# Patient Record
Sex: FEMALE | Race: OTHER | Employment: UNEMPLOYED | URBAN - METROPOLITAN AREA
[De-identification: names, ages, dates, MRNs, and addresses within clinical notes are randomized per-mention and may not be internally consistent; named-entity substitution may affect disease eponyms.]

---

## 2022-12-11 ENCOUNTER — HOSPITAL ENCOUNTER (EMERGENCY)
Dept: CT IMAGING | Age: 42
Discharge: HOME | DRG: 872 | End: 2022-12-14

## 2022-12-11 ENCOUNTER — HOSPITAL ENCOUNTER (INPATIENT)
Age: 42
LOS: 4 days | Discharge: HOME | DRG: 872 | End: 2022-12-15
Attending: EMERGENCY MEDICINE | Admitting: FAMILY MEDICINE

## 2022-12-11 ENCOUNTER — HOSPITAL ENCOUNTER (EMERGENCY)
Dept: GENERAL RADIOLOGY | Age: 42
Discharge: HOME | DRG: 872 | End: 2022-12-14

## 2022-12-11 DIAGNOSIS — R73.9 HYPERGLYCEMIA: ICD-10-CM

## 2022-12-11 DIAGNOSIS — A41.9 SEPSIS, DUE TO UNSPECIFIED ORGANISM, UNSPECIFIED WHETHER ACUTE ORGAN DYSFUNCTION PRESENT (HCC): ICD-10-CM

## 2022-12-11 DIAGNOSIS — N17.9 AKI (ACUTE KIDNEY INJURY) (HCC): ICD-10-CM

## 2022-12-11 DIAGNOSIS — N10 ACUTE PYELONEPHRITIS: Primary | ICD-10-CM

## 2022-12-11 PROBLEM — N12 PYELONEPHRITIS: Status: ACTIVE | Noted: 2022-12-11

## 2022-12-11 LAB
ALBUMIN SERPL-MCNC: 2.7 G/DL (ref 3.5–5)
ALBUMIN/GLOB SERPL: 0.6 {RATIO} (ref 0.4–1.6)
ALP SERPL-CCNC: 141 U/L (ref 50–136)
ALT SERPL-CCNC: 33 U/L (ref 12–65)
ANION GAP SERPL CALC-SCNC: 6 MMOL/L (ref 2–11)
APPEARANCE UR: ABNORMAL
AST SERPL-CCNC: 43 U/L (ref 15–37)
BACTERIA URNS QL MICRO: ABNORMAL /HPF
BASOPHILS # BLD: 0.1 K/UL (ref 0–0.2)
BASOPHILS NFR BLD: 0 % (ref 0–2)
BILIRUB SERPL-MCNC: 0.6 MG/DL (ref 0.2–1.1)
BILIRUB UR QL: ABNORMAL
BUN SERPL-MCNC: 17 MG/DL (ref 6–23)
CALCIUM SERPL-MCNC: 8.8 MG/DL (ref 8.3–10.4)
CHLORIDE SERPL-SCNC: 96 MMOL/L (ref 101–110)
CO2 SERPL-SCNC: 25 MMOL/L (ref 21–32)
COLOR UR: ABNORMAL
CREAT SERPL-MCNC: 1.35 MG/DL (ref 0.6–1)
DIFFERENTIAL METHOD BLD: ABNORMAL
EOSINOPHIL # BLD: 0 K/UL (ref 0–0.8)
EOSINOPHIL NFR BLD: 0 % (ref 0.5–7.8)
EPI CELLS #/AREA URNS HPF: ABNORMAL /HPF
ERYTHROCYTE [DISTWIDTH] IN BLOOD BY AUTOMATED COUNT: 13.5 % (ref 11.9–14.6)
FLUAV AG NPH QL IA: NEGATIVE
FLUBV AG NPH QL IA: NEGATIVE
GLOBULIN SER CALC-MCNC: 4.8 G/DL (ref 2.8–4.5)
GLUCOSE BLD STRIP.AUTO-MCNC: 275 MG/DL (ref 65–100)
GLUCOSE SERPL-MCNC: 578 MG/DL (ref 65–100)
GLUCOSE UR STRIP.AUTO-MCNC: >1000 MG/DL
HCG UR QL: NEGATIVE
HCT VFR BLD AUTO: 31.1 % (ref 35.8–46.3)
HGB BLD-MCNC: 9.8 G/DL (ref 11.7–15.4)
HGB UR QL STRIP: ABNORMAL
IMM GRANULOCYTES # BLD AUTO: 0.1 K/UL (ref 0–0.5)
IMM GRANULOCYTES NFR BLD AUTO: 1 % (ref 0–5)
KETONES UR QL STRIP.AUTO: NEGATIVE MG/DL
LACTATE SERPL-SCNC: 1.4 MMOL/L (ref 0.4–2)
LEUKOCYTE ESTERASE UR QL STRIP.AUTO: ABNORMAL
LYMPHOCYTES # BLD: 1 K/UL (ref 0.5–4.6)
LYMPHOCYTES NFR BLD: 7 % (ref 13–44)
MCH RBC QN AUTO: 28.6 PG (ref 26.1–32.9)
MCHC RBC AUTO-ENTMCNC: 31.5 G/DL (ref 31.4–35)
MCV RBC AUTO: 90.7 FL (ref 82–102)
MONOCYTES # BLD: 0.7 K/UL (ref 0.1–1.3)
MONOCYTES NFR BLD: 5 % (ref 4–12)
NEUTS SEG # BLD: 13 K/UL (ref 1.7–8.2)
NEUTS SEG NFR BLD: 87 % (ref 43–78)
NITRITE UR QL STRIP.AUTO: POSITIVE
NRBC # BLD: 0 K/UL (ref 0–0.2)
PH UR STRIP: 5 [PH] (ref 5–9)
PLATELET # BLD AUTO: 294 K/UL (ref 150–450)
PMV BLD AUTO: 9.8 FL (ref 9.4–12.3)
POTASSIUM SERPL-SCNC: 4.4 MMOL/L (ref 3.5–5.1)
PROCALCITONIN SERPL-MCNC: 2.63 NG/ML (ref 0–0.49)
PROT SERPL-MCNC: 7.5 G/DL (ref 6.3–8.2)
PROT UR STRIP-MCNC: 100 MG/DL
RBC # BLD AUTO: 3.43 M/UL (ref 4.05–5.2)
RBC #/AREA URNS HPF: ABNORMAL /HPF
SARS-COV-2 RDRP RESP QL NAA+PROBE: NOT DETECTED
SERVICE CMNT-IMP: ABNORMAL
SODIUM SERPL-SCNC: 127 MMOL/L (ref 133–143)
SOURCE: NORMAL
SP GR UR REFRACTOMETRY: 1.03 (ref 1–1.02)
SPECIMEN SOURCE: NORMAL
UROBILINOGEN UR QL STRIP.AUTO: 1 EU/DL (ref 0.2–1)
WBC # BLD AUTO: 14.9 K/UL (ref 4.3–11.1)
WBC URNS QL MICRO: ABNORMAL /HPF
YEAST URNS QL MICRO: ABNORMAL

## 2022-12-11 PROCEDURE — 87077 CULTURE AEROBIC IDENTIFY: CPT

## 2022-12-11 PROCEDURE — 74176 CT ABD & PELVIS W/O CONTRAST: CPT

## 2022-12-11 PROCEDURE — 96365 THER/PROPH/DIAG IV INF INIT: CPT

## 2022-12-11 PROCEDURE — 83605 ASSAY OF LACTIC ACID: CPT

## 2022-12-11 PROCEDURE — 71045 X-RAY EXAM CHEST 1 VIEW: CPT

## 2022-12-11 PROCEDURE — 87186 SC STD MICRODIL/AGAR DIL: CPT

## 2022-12-11 PROCEDURE — 6360000002 HC RX W HCPCS: Performed by: EMERGENCY MEDICINE

## 2022-12-11 PROCEDURE — 84145 PROCALCITONIN (PCT): CPT

## 2022-12-11 PROCEDURE — 2580000003 HC RX 258: Performed by: EMERGENCY MEDICINE

## 2022-12-11 PROCEDURE — 87804 INFLUENZA ASSAY W/OPTIC: CPT

## 2022-12-11 PROCEDURE — 96375 TX/PRO/DX INJ NEW DRUG ADDON: CPT

## 2022-12-11 PROCEDURE — 81025 URINE PREGNANCY TEST: CPT

## 2022-12-11 PROCEDURE — 87205 SMEAR GRAM STAIN: CPT

## 2022-12-11 PROCEDURE — 81001 URINALYSIS AUTO W/SCOPE: CPT

## 2022-12-11 PROCEDURE — 96374 THER/PROPH/DIAG INJ IV PUSH: CPT

## 2022-12-11 PROCEDURE — 87040 BLOOD CULTURE FOR BACTERIA: CPT

## 2022-12-11 PROCEDURE — 80053 COMPREHEN METABOLIC PANEL: CPT

## 2022-12-11 PROCEDURE — 87086 URINE CULTURE/COLONY COUNT: CPT

## 2022-12-11 PROCEDURE — 1100000000 HC RM PRIVATE

## 2022-12-11 PROCEDURE — 87635 SARS-COV-2 COVID-19 AMP PRB: CPT

## 2022-12-11 PROCEDURE — 6370000000 HC RX 637 (ALT 250 FOR IP)

## 2022-12-11 PROCEDURE — 96361 HYDRATE IV INFUSION ADD-ON: CPT

## 2022-12-11 PROCEDURE — 87150 DNA/RNA AMPLIFIED PROBE: CPT

## 2022-12-11 PROCEDURE — 82962 GLUCOSE BLOOD TEST: CPT

## 2022-12-11 PROCEDURE — 85025 COMPLETE CBC W/AUTO DIFF WBC: CPT

## 2022-12-11 PROCEDURE — 99285 EMERGENCY DEPT VISIT HI MDM: CPT

## 2022-12-11 RX ORDER — 0.9 % SODIUM CHLORIDE 0.9 %
1000 INTRAVENOUS SOLUTION INTRAVENOUS ONCE
Status: COMPLETED | OUTPATIENT
Start: 2022-12-11 | End: 2022-12-12

## 2022-12-11 RX ORDER — SODIUM CHLORIDE, SODIUM LACTATE, POTASSIUM CHLORIDE, AND CALCIUM CHLORIDE .6; .31; .03; .02 G/100ML; G/100ML; G/100ML; G/100ML
30 INJECTION, SOLUTION INTRAVENOUS ONCE
Status: COMPLETED | OUTPATIENT
Start: 2022-12-11 | End: 2022-12-11

## 2022-12-11 RX ORDER — ONDANSETRON 2 MG/ML
4 INJECTION INTRAMUSCULAR; INTRAVENOUS
Status: COMPLETED | OUTPATIENT
Start: 2022-12-11 | End: 2022-12-11

## 2022-12-11 RX ADMIN — INSULIN HUMAN 10 UNITS: 100 INJECTION, SOLUTION PARENTERAL at 22:33

## 2022-12-11 RX ADMIN — SODIUM CHLORIDE, POTASSIUM CHLORIDE, SODIUM LACTATE AND CALCIUM CHLORIDE 1365 ML: 600; 310; 30; 20 INJECTION, SOLUTION INTRAVENOUS at 21:34

## 2022-12-11 RX ADMIN — SODIUM CHLORIDE 1000 ML: 9 INJECTION, SOLUTION INTRAVENOUS at 22:32

## 2022-12-11 RX ADMIN — CEFTRIAXONE 1000 MG: 1 INJECTION, POWDER, FOR SOLUTION INTRAMUSCULAR; INTRAVENOUS at 21:35

## 2022-12-11 RX ADMIN — ONDANSETRON 4 MG: 2 INJECTION INTRAMUSCULAR; INTRAVENOUS at 21:35

## 2022-12-11 ASSESSMENT — PAIN SCALES - GENERAL
PAINLEVEL_OUTOF10: 8
PAINLEVEL_OUTOF10: 8
PAINLEVEL_OUTOF10: 3
PAINLEVEL_OUTOF10: 8

## 2022-12-11 ASSESSMENT — ENCOUNTER SYMPTOMS
SORE THROAT: 0
PHOTOPHOBIA: 0
COUGH: 1
RHINORRHEA: 1
BLOOD IN STOOL: 0
SHORTNESS OF BREATH: 0
VOICE CHANGE: 0
STRIDOR: 0
CONSTIPATION: 0
ABDOMINAL DISTENTION: 0
DIARRHEA: 0
WHEEZING: 0
TROUBLE SWALLOWING: 0
VOMITING: 1
NAUSEA: 1

## 2022-12-11 ASSESSMENT — PAIN DESCRIPTION - LOCATION: LOCATION: GENERALIZED

## 2022-12-11 ASSESSMENT — PAIN - FUNCTIONAL ASSESSMENT: PAIN_FUNCTIONAL_ASSESSMENT: 0-10

## 2022-12-12 PROBLEM — N17.9 AKI (ACUTE KIDNEY INJURY) (HCC): Status: ACTIVE | Noted: 2022-12-12

## 2022-12-12 PROBLEM — E11.9 DM2 (DIABETES MELLITUS, TYPE 2) (HCC): Status: ACTIVE | Noted: 2022-12-12

## 2022-12-12 PROBLEM — E87.1 HYPONATREMIA: Status: ACTIVE | Noted: 2022-12-12

## 2022-12-12 PROBLEM — E78.2 HYPERLIPIDEMIA, MIXED: Status: ACTIVE | Noted: 2019-04-15

## 2022-12-12 LAB
ACCESSION NUMBER, LLC1M: ABNORMAL
ACINETOBACTER CALCOAC BAUMANNII COMPLEX BY PCR: NOT DETECTED
ALBUMIN SERPL-MCNC: 2.1 G/DL (ref 3.5–5)
ALBUMIN/GLOB SERPL: 0.5 {RATIO} (ref 0.4–1.6)
ALP SERPL-CCNC: 117 U/L (ref 50–130)
ALT SERPL-CCNC: 26 U/L (ref 12–65)
ANION GAP SERPL CALC-SCNC: 4 MMOL/L (ref 2–11)
AST SERPL-CCNC: 20 U/L (ref 15–37)
BACTEROIDES FRAGILIS BY PCR: NOT DETECTED
BASOPHILS # BLD: 0 K/UL (ref 0–0.2)
BASOPHILS NFR BLD: 0 % (ref 0–2)
BILIRUB SERPL-MCNC: 0.4 MG/DL (ref 0.2–1.1)
BIOFIRE TEST COMMENT: ABNORMAL
BLACTX-M ISLT/SPM QL: NOT DETECTED
BLAIMP ISLT/SPM QL: NOT DETECTED
BLAKPC BLD POS QL NAA+NON-PROBE: NOT DETECTED
BLAOXA-48-LIKE ISLT/SPM QL: NOT DETECTED
BLAVIM ISLT/SPM QL: NOT DETECTED
BUN SERPL-MCNC: 13 MG/DL (ref 6–23)
C ALBICANS DNA BLD POS QL NAA+NON-PROBE: NOT DETECTED
C GLABRATA DNA BLD POS QL NAA+NON-PROBE: NOT DETECTED
C KRUSEI DNA BLD POS QL NAA+NON-PROBE: NOT DETECTED
C PARAP DNA BLD POS QL NAA+NON-PROBE: NOT DETECTED
C TROPICLS DNA BLD POS QL NAA+NON-PROBE: NOT DETECTED
CALCIUM SERPL-MCNC: 7.8 MG/DL (ref 8.3–10.4)
CANDIDA AURIS BY PCR: NOT DETECTED
CHLORIDE SERPL-SCNC: 110 MMOL/L (ref 101–110)
CO2 SERPL-SCNC: 25 MMOL/L (ref 21–32)
COLISTIN RES MCR-1 ISLT/SPM QL: NOT DETECTED
CREAT SERPL-MCNC: 0.76 MG/DL (ref 0.6–1)
CRYPTOCOCCUS NEOFORMANS/GATTII BY PCR: NOT DETECTED
DIFFERENTIAL METHOD BLD: ABNORMAL
E CLOAC COMP DNA BLD POS NAA+NON-PROBE: NOT DETECTED
E COLI DNA BLD POS QL NAA+NON-PROBE: DETECTED
EKG ATRIAL RATE: 102 BPM
EKG DIAGNOSIS: NORMAL
EKG P AXIS: 58 DEGREES
EKG P-R INTERVAL: 136 MS
EKG Q-T INTERVAL: 324 MS
EKG QRS DURATION: 76 MS
EKG QTC CALCULATION (BAZETT): 422 MS
EKG R AXIS: 109 DEGREES
EKG T AXIS: 34 DEGREES
EKG VENTRICULAR RATE: 102 BPM
ENTEROBACTERALES BY PCR: DETECTED
ENTEROCOCCUS FAECALIS BY PCR: NOT DETECTED
ENTEROCOCCUS FAECIUM BY PCR: NOT DETECTED
EOSINOPHIL # BLD: 0 K/UL (ref 0–0.8)
EOSINOPHIL NFR BLD: 0 % (ref 0.5–7.8)
ERYTHROCYTE [DISTWIDTH] IN BLOOD BY AUTOMATED COUNT: 13.6 % (ref 11.9–14.6)
GLOBULIN SER CALC-MCNC: 4.2 G/DL (ref 2.8–4.5)
GLUCOSE BLD STRIP.AUTO-MCNC: 209 MG/DL (ref 65–100)
GLUCOSE BLD STRIP.AUTO-MCNC: 248 MG/DL (ref 65–100)
GLUCOSE BLD STRIP.AUTO-MCNC: 258 MG/DL (ref 65–100)
GLUCOSE BLD STRIP.AUTO-MCNC: 432 MG/DL (ref 65–100)
GLUCOSE SERPL-MCNC: 256 MG/DL (ref 65–100)
GP B STREP DNA BLD POS QL NAA+NON-PROBE: NOT DETECTED
HAEM INFLU DNA BLD POS QL NAA+NON-PROBE: NOT DETECTED
HCT VFR BLD AUTO: 26.4 % (ref 35.8–46.3)
HGB BLD-MCNC: 8.5 G/DL (ref 11.7–15.4)
IMM GRANULOCYTES # BLD AUTO: 0.1 K/UL (ref 0–0.5)
IMM GRANULOCYTES NFR BLD AUTO: 1 % (ref 0–5)
K OXYTOCA DNA BLD POS QL NAA+NON-PROBE: NOT DETECTED
KLEBSIELLA AEROGENES BY PCR: NOT DETECTED
KLEBSIELLA PNEUMONIAE GROUP BY PCR: NOT DETECTED
L MONOCYTOG DNA BLD POS QL NAA+NON-PROBE: NOT DETECTED
LACTATE SERPL-SCNC: 0.9 MMOL/L (ref 0.4–2)
LYMPHOCYTES # BLD: 0.8 K/UL (ref 0.5–4.6)
LYMPHOCYTES NFR BLD: 8 % (ref 13–44)
MCH RBC QN AUTO: 29.2 PG (ref 26.1–32.9)
MCHC RBC AUTO-ENTMCNC: 32.2 G/DL (ref 31.4–35)
MCV RBC AUTO: 90.7 FL (ref 82–102)
MONOCYTES # BLD: 0.4 K/UL (ref 0.1–1.3)
MONOCYTES NFR BLD: 4 % (ref 4–12)
N MEN DNA BLD POS QL NAA+NON-PROBE: NOT DETECTED
NEUTS SEG # BLD: 9.2 K/UL (ref 1.7–8.2)
NEUTS SEG NFR BLD: 87 % (ref 43–78)
NRBC # BLD: 0 K/UL (ref 0–0.2)
P AERUGINOSA DNA BLD POS NAA+NON-PROBE: NOT DETECTED
PLATELET # BLD AUTO: 246 K/UL (ref 150–450)
PMV BLD AUTO: 9.6 FL (ref 9.4–12.3)
POTASSIUM SERPL-SCNC: 3.7 MMOL/L (ref 3.5–5.1)
PROT SERPL-MCNC: 6.3 G/DL (ref 6.3–8.2)
PROTEUS SP DNA BLD POS QL NAA+NON-PROBE: NOT DETECTED
RBC # BLD AUTO: 2.91 M/UL (ref 4.05–5.2)
RESISTANT GENE NDM BY PCR: NOT DETECTED
RESISTANT GENE TARGETS: ABNORMAL
S AUREUS DNA BLD POS QL NAA+NON-PROBE: NOT DETECTED
S AUREUS+CONS DNA BLD POS NAA+NON-PROBE: NOT DETECTED
S MARCESCENS DNA BLD POS NAA+NON-PROBE: NOT DETECTED
S PNEUM DNA BLD POS QL NAA+NON-PROBE: NOT DETECTED
S PYO DNA BLD POS QL NAA+NON-PROBE: NOT DETECTED
SALMONELLA SPECIES BY PCR: NOT DETECTED
SERVICE CMNT-IMP: ABNORMAL
SODIUM SERPL-SCNC: 139 MMOL/L (ref 133–143)
STAPHYLOCOCCUS EPIDERMIDIS BY PCR: NOT DETECTED
STAPHYLOCOCCUS LUGDUNENSIS BY PCR: NOT DETECTED
STENOTROPHOMONAS MALTOPHILIA BY PCR: NOT DETECTED
STREPTOCOCCUS DNA BLD POS NAA+NON-PROBE: NOT DETECTED
WBC # BLD AUTO: 10.6 K/UL (ref 4.3–11.1)

## 2022-12-12 PROCEDURE — 36415 COLL VENOUS BLD VENIPUNCTURE: CPT

## 2022-12-12 PROCEDURE — 2580000003 HC RX 258: Performed by: NURSE PRACTITIONER

## 2022-12-12 PROCEDURE — 1100000000 HC RM PRIVATE

## 2022-12-12 PROCEDURE — 6370000000 HC RX 637 (ALT 250 FOR IP): Performed by: FAMILY MEDICINE

## 2022-12-12 PROCEDURE — 82962 GLUCOSE BLOOD TEST: CPT

## 2022-12-12 PROCEDURE — 80053 COMPREHEN METABOLIC PANEL: CPT

## 2022-12-12 PROCEDURE — 6360000002 HC RX W HCPCS: Performed by: FAMILY MEDICINE

## 2022-12-12 PROCEDURE — 6360000002 HC RX W HCPCS: Performed by: NURSE PRACTITIONER

## 2022-12-12 PROCEDURE — 85025 COMPLETE CBC W/AUTO DIFF WBC: CPT

## 2022-12-12 PROCEDURE — 83605 ASSAY OF LACTIC ACID: CPT

## 2022-12-12 PROCEDURE — 6370000000 HC RX 637 (ALT 250 FOR IP): Performed by: NURSE PRACTITIONER

## 2022-12-12 PROCEDURE — 2580000003 HC RX 258: Performed by: FAMILY MEDICINE

## 2022-12-12 RX ORDER — INSULIN LISPRO 100 [IU]/ML
6 INJECTION, SOLUTION INTRAVENOUS; SUBCUTANEOUS ONCE
Status: COMPLETED | OUTPATIENT
Start: 2022-12-12 | End: 2022-12-12

## 2022-12-12 RX ORDER — ACETAMINOPHEN 650 MG/1
650 SUPPOSITORY RECTAL EVERY 6 HOURS PRN
Status: DISCONTINUED | OUTPATIENT
Start: 2022-12-12 | End: 2022-12-15 | Stop reason: HOSPADM

## 2022-12-12 RX ORDER — 0.9 % SODIUM CHLORIDE 0.9 %
1000 INTRAVENOUS SOLUTION INTRAVENOUS ONCE
Status: COMPLETED | OUTPATIENT
Start: 2022-12-12 | End: 2022-12-12

## 2022-12-12 RX ORDER — INSULIN GLARGINE 100 [IU]/ML
20 INJECTION, SOLUTION SUBCUTANEOUS
Status: ON HOLD | COMMUNITY
Start: 2022-11-08 | End: 2022-12-15 | Stop reason: SDUPTHER

## 2022-12-12 RX ORDER — HYDROMORPHONE HYDROCHLORIDE 1 MG/ML
1 INJECTION, SOLUTION INTRAMUSCULAR; INTRAVENOUS; SUBCUTANEOUS EVERY 4 HOURS PRN
Status: DISCONTINUED | OUTPATIENT
Start: 2022-12-12 | End: 2022-12-15 | Stop reason: HOSPADM

## 2022-12-12 RX ORDER — ATORVASTATIN CALCIUM 20 MG/1
40 TABLET, FILM COATED ORAL NIGHTLY
COMMUNITY
Start: 2022-11-08

## 2022-12-12 RX ORDER — ENOXAPARIN SODIUM 100 MG/ML
40 INJECTION SUBCUTANEOUS DAILY
Status: DISCONTINUED | OUTPATIENT
Start: 2022-12-12 | End: 2022-12-15 | Stop reason: HOSPADM

## 2022-12-12 RX ORDER — SODIUM CHLORIDE 0.9 % (FLUSH) 0.9 %
5-40 SYRINGE (ML) INJECTION PRN
Status: DISCONTINUED | OUTPATIENT
Start: 2022-12-12 | End: 2022-12-15 | Stop reason: HOSPADM

## 2022-12-12 RX ORDER — POLYETHYLENE GLYCOL 3350 17 G/17G
17 POWDER, FOR SOLUTION ORAL DAILY PRN
Status: DISCONTINUED | OUTPATIENT
Start: 2022-12-12 | End: 2022-12-15 | Stop reason: HOSPADM

## 2022-12-12 RX ORDER — ONDANSETRON 4 MG/1
4 TABLET, ORALLY DISINTEGRATING ORAL EVERY 8 HOURS PRN
Status: DISCONTINUED | OUTPATIENT
Start: 2022-12-12 | End: 2022-12-15 | Stop reason: HOSPADM

## 2022-12-12 RX ORDER — FERROUS SULFATE 325(65) MG
325 TABLET ORAL DAILY
COMMUNITY
Start: 2022-11-08

## 2022-12-12 RX ORDER — BENZONATATE 100 MG/1
100 CAPSULE ORAL 3 TIMES DAILY PRN
Status: DISCONTINUED | OUTPATIENT
Start: 2022-12-12 | End: 2022-12-15 | Stop reason: HOSPADM

## 2022-12-12 RX ORDER — INSULIN LISPRO 100 [IU]/ML
0-4 INJECTION, SOLUTION INTRAVENOUS; SUBCUTANEOUS NIGHTLY
Status: DISCONTINUED | OUTPATIENT
Start: 2022-12-12 | End: 2022-12-14

## 2022-12-12 RX ORDER — SODIUM CHLORIDE 9 MG/ML
INJECTION, SOLUTION INTRAVENOUS PRN
Status: DISCONTINUED | OUTPATIENT
Start: 2022-12-12 | End: 2022-12-15 | Stop reason: HOSPADM

## 2022-12-12 RX ORDER — ACETAMINOPHEN 325 MG/1
650 TABLET ORAL EVERY 6 HOURS PRN
Status: DISCONTINUED | OUTPATIENT
Start: 2022-12-12 | End: 2022-12-15 | Stop reason: HOSPADM

## 2022-12-12 RX ORDER — ATORVASTATIN CALCIUM 40 MG/1
20 TABLET, FILM COATED ORAL NIGHTLY
Status: DISCONTINUED | OUTPATIENT
Start: 2022-12-12 | End: 2022-12-15 | Stop reason: HOSPADM

## 2022-12-12 RX ORDER — SODIUM CHLORIDE 0.9 % (FLUSH) 0.9 %
5-40 SYRINGE (ML) INJECTION EVERY 12 HOURS SCHEDULED
Status: DISCONTINUED | OUTPATIENT
Start: 2022-12-12 | End: 2022-12-15 | Stop reason: HOSPADM

## 2022-12-12 RX ORDER — FERROUS SULFATE 325(65) MG
325 TABLET ORAL DAILY
Status: DISCONTINUED | OUTPATIENT
Start: 2022-12-12 | End: 2022-12-15 | Stop reason: HOSPADM

## 2022-12-12 RX ORDER — L. ACIDOPHILUS/L.BULGARICUS 1MM CELL
4 TABLET ORAL 3 TIMES DAILY
Status: DISCONTINUED | OUTPATIENT
Start: 2022-12-12 | End: 2022-12-15 | Stop reason: HOSPADM

## 2022-12-12 RX ORDER — ONDANSETRON 2 MG/ML
4 INJECTION INTRAMUSCULAR; INTRAVENOUS EVERY 6 HOURS PRN
Status: DISCONTINUED | OUTPATIENT
Start: 2022-12-12 | End: 2022-12-15 | Stop reason: HOSPADM

## 2022-12-12 RX ORDER — HYDROCODONE BITARTRATE AND ACETAMINOPHEN 5; 325 MG/1; MG/1
1 TABLET ORAL EVERY 6 HOURS PRN
Status: DISCONTINUED | OUTPATIENT
Start: 2022-12-12 | End: 2022-12-15 | Stop reason: HOSPADM

## 2022-12-12 RX ORDER — INSULIN LISPRO 100 [IU]/ML
0-8 INJECTION, SOLUTION INTRAVENOUS; SUBCUTANEOUS
Status: DISCONTINUED | OUTPATIENT
Start: 2022-12-12 | End: 2022-12-14

## 2022-12-12 RX ORDER — INSULIN GLARGINE 100 [IU]/ML
20 INJECTION, SOLUTION SUBCUTANEOUS
Status: DISCONTINUED | OUTPATIENT
Start: 2022-12-12 | End: 2022-12-13

## 2022-12-12 RX ORDER — ONDANSETRON 2 MG/ML
4 INJECTION INTRAMUSCULAR; INTRAVENOUS ONCE
Status: COMPLETED | OUTPATIENT
Start: 2022-12-12 | End: 2022-12-12

## 2022-12-12 RX ORDER — SODIUM CHLORIDE 9 MG/ML
INJECTION, SOLUTION INTRAVENOUS CONTINUOUS
Status: DISCONTINUED | OUTPATIENT
Start: 2022-12-12 | End: 2022-12-15 | Stop reason: HOSPADM

## 2022-12-12 RX ADMIN — HYDROCODONE BITARTRATE AND ACETAMINOPHEN 1 TABLET: 5; 325 TABLET ORAL at 16:22

## 2022-12-12 RX ADMIN — CEFTRIAXONE 2000 MG: 2 INJECTION, POWDER, FOR SOLUTION INTRAMUSCULAR; INTRAVENOUS at 21:09

## 2022-12-12 RX ADMIN — ENOXAPARIN SODIUM 40 MG: 100 INJECTION SUBCUTANEOUS at 08:25

## 2022-12-12 RX ADMIN — INSULIN LISPRO 4 UNITS: 100 INJECTION, SOLUTION INTRAVENOUS; SUBCUTANEOUS at 16:42

## 2022-12-12 RX ADMIN — HYDROCODONE BITARTRATE AND ACETAMINOPHEN 1 TABLET: 5; 325 TABLET ORAL at 22:16

## 2022-12-12 RX ADMIN — SODIUM CHLORIDE: 9 INJECTION, SOLUTION INTRAVENOUS at 08:29

## 2022-12-12 RX ADMIN — SODIUM CHLORIDE, PRESERVATIVE FREE 10 ML: 5 INJECTION INTRAVENOUS at 08:29

## 2022-12-12 RX ADMIN — SODIUM CHLORIDE: 9 INJECTION, SOLUTION INTRAVENOUS at 15:35

## 2022-12-12 RX ADMIN — INSULIN LISPRO 4 UNITS: 100 INJECTION, SOLUTION INTRAVENOUS; SUBCUTANEOUS at 21:11

## 2022-12-12 RX ADMIN — LACTOBACILLUS TAB 4 TABLET: TAB at 21:08

## 2022-12-12 RX ADMIN — SODIUM CHLORIDE, PRESERVATIVE FREE 10 ML: 5 INJECTION INTRAVENOUS at 21:10

## 2022-12-12 RX ADMIN — INSULIN LISPRO 2 UNITS: 100 INJECTION, SOLUTION INTRAVENOUS; SUBCUTANEOUS at 11:53

## 2022-12-12 RX ADMIN — SODIUM CHLORIDE 1000 ML: 900 INJECTION, SOLUTION INTRAVENOUS at 00:21

## 2022-12-12 RX ADMIN — INSULIN LISPRO 6 UNITS: 100 INJECTION, SOLUTION INTRAVENOUS; SUBCUTANEOUS at 21:20

## 2022-12-12 RX ADMIN — SODIUM CHLORIDE: 9 INJECTION, SOLUTION INTRAVENOUS at 22:19

## 2022-12-12 RX ADMIN — INSULIN GLARGINE 20 UNITS: 100 INJECTION, SOLUTION SUBCUTANEOUS at 06:44

## 2022-12-12 RX ADMIN — ATORVASTATIN CALCIUM 20 MG: 40 TABLET, FILM COATED ORAL at 21:08

## 2022-12-12 RX ADMIN — FERROUS SULFATE TAB 325 MG (65 MG ELEMENTAL FE) 325 MG: 325 (65 FE) TAB at 08:25

## 2022-12-12 RX ADMIN — ONDANSETRON 4 MG: 2 INJECTION INTRAMUSCULAR; INTRAVENOUS at 00:35

## 2022-12-12 RX ADMIN — SODIUM CHLORIDE: 9 INJECTION, SOLUTION INTRAVENOUS at 03:04

## 2022-12-12 RX ADMIN — INSULIN LISPRO 2 UNITS: 100 INJECTION, SOLUTION INTRAVENOUS; SUBCUTANEOUS at 08:25

## 2022-12-12 RX ADMIN — BENZONATATE 100 MG: 100 CAPSULE ORAL at 16:41

## 2022-12-12 ASSESSMENT — PAIN SCALES - GENERAL
PAINLEVEL_OUTOF10: 2
PAINLEVEL_OUTOF10: 5
PAINLEVEL_OUTOF10: 6
PAINLEVEL_OUTOF10: 0
PAINLEVEL_OUTOF10: 3

## 2022-12-12 ASSESSMENT — PAIN - FUNCTIONAL ASSESSMENT: PAIN_FUNCTIONAL_ASSESSMENT: ACTIVITIES ARE NOT PREVENTED

## 2022-12-12 ASSESSMENT — PAIN DESCRIPTION - ORIENTATION
ORIENTATION: RIGHT;LEFT
ORIENTATION: LEFT

## 2022-12-12 ASSESSMENT — PAIN DESCRIPTION - DESCRIPTORS
DESCRIPTORS: ACHING;SORE;CRAMPING
DESCRIPTORS: PRESSURE

## 2022-12-12 ASSESSMENT — PAIN DESCRIPTION - LOCATION
LOCATION: FLANK
LOCATION: FLANK

## 2022-12-12 NOTE — PROGRESS NOTES
Patient speaks Persian as their preferred language for their healthcare communication. If there are technical problems using the AM[employer] mobil unit, please contact Language Services for interpretation at:    Senior Alexei -Navigator ([phone])  General phone: [phone] ( [phone])  Email: [email or web address]    Please always document the use of interpreting services (name and/or 's ID number) in your clinical notes. Our interpreters are available for team members working with limited  English proficient (LEP) patients remotely, in person (if needed for special cases), as phone or video interpreters on the [employer] Mobil units.         Thank you,        Mayda VERA  Senior /Navigator

## 2022-12-12 NOTE — ED NOTES
TRANSFER - OUT REPORT:    Verbal report given to Jermaine Garcia RN on Meghan Saunders  being transferred to [id number] for routine progression of patient care       Report consisted of patient's Situation, Background, Assessment and   Recommendations(SBAR). Information from the following report(s) ED Encounter Summary was reviewed with the receiving nurse. Lines:   Peripheral IV 12/11/22 Left Hand (Active)   Site Assessment Clean, dry & intact 12/11/22 2130   Line Status Brisk blood return 12/11/22 2130       Peripheral IV 12/11/22 Right Antecubital (Active)   Site Assessment Clean, dry & intact 12/11/22 2131   Line Status Brisk blood return 12/11/22 2131        Opportunity for questions and clarification was provided.       Patient transported with:  Registered Nurse       Fanny Fitch RN  12/12/22 0001

## 2022-12-12 NOTE — PROGRESS NOTES
/Cultural Navigator assessed patient's language needs. The patient states that She understands English but she would like to have an [place]  when interacting with medical staff. LS available resources were offered as needed.     Thank you,          Elmira Yung [facility] [employer]  [phone] (phone)

## 2022-12-12 NOTE — CARE COORDINATION
Medical record reviewed. Patient care plan reviewed in interdisciplinary rounds with the following disciplines: MD, nursing, case management, therapy, and nutrition services. CM met with patient to introduce self and explain role in planning.  services utilized (Vicky Shell # [id number]). Prior to admission, pt was living independently in her home with her family (spouse, adult dtr- Lady Tierney, and 8 yr old dtr). Pt works part time as a .  is employed. Pt is uninsured but has a primary care provider. She could not remember the name of the clinic however she confirmed that it is an income based clinic in [place]. No discharge planning needs are anticipated however CM will continue to follow to assist if needed. 12/12/22 6491   Service Assessment   Patient Orientation Alert and Oriented;Person;Place;Situation;Self   Cognition Alert   History Provided By Patient   Primary Caregiver Self   Accompanied By/Relationship no one   Support Systems Spouse/Significant Other;Children   Patient's Healthcare Decision Maker is: Legal Next of Kin   PCP Verified by CM Yes   Last Visit to PCP Within last 6 months   Prior Functional Level Independent in ADLs/IADLs   Current Functional Level Assistance with the following:;Housework; Shopping   Can patient return to prior living arrangement Yes   Ability to make needs known: Good   Family able to assist with home care needs: Yes   Would you like for me to discuss the discharge plan with any other family members/significant others, and if so, who?  No   Financial Resources Financial Counseling   Community Resources Other (Comment)  ([facility] clinic in [place])

## 2022-12-12 NOTE — PROGRESS NOTES
TRANSFER - IN REPORT:    Verbal report received from [facility], RN on Garo Benson  being received from ED for routine progression of patient care      Report consisted of patient's Situation, Background, Assessment and   Recommendations(SBAR). Information from the following report(s) Nurse Handoff Report, ED Encounter Summary, ED SBAR, Adult Overview, Intake/Output, and MAR was reviewed with the receiving nurse. Opportunity for questions and clarification was provided. Assessment completed per RN upon patient's arrival to unit and care assumed.

## 2022-12-12 NOTE — ASSESSMENT & PLAN NOTE
- UA indicative of infection, CT with evidence of pyelo  - IV rocephin  - Urine and blood cultures obtained  - IVFs

## 2022-12-12 NOTE — ED PROVIDER NOTES
Emergency Department Provider Note                   PCP:                None Provider               Age: 39 y.o. Sex: female       ICD-10-CM    1. Acute pyelonephritis  N10       2. Sepsis, due to unspecified organism, unspecified whether acute organ dysfunction present ([facility] [address]  A41.9       3. Hyperglycemia  R73.9           DISPOSITION          MDM  Number of Diagnoses or Management Options  Acute pyelonephritis  Hyperglycemia: new, needed workup  Sepsis, due to unspecified organism, unspecified whether acute organ dysfunction present [facility]): new, needed workup  Diagnosis management comments: Flu, COVID swab, UA ordered in triage. Upon evaluating patient, decision made to obtain IV access and sepsis protocol orders. 30 cc/kg IV fluid bolus of LR ordered as well as Rocephin 1 g IV given RN reports the urine dipstick with positive nitrites. Patient recently admitted for pyelonephritis/sepsis in October 2022. Urine culture positive for E. coli Rocephin. Urine micro with 4+ bacteria, positive nitrite, moderate LE, 20-50 WBCs. Urine culture added on. Sodium 127. CO2 normal at 25. Anion gap 6. Glucose noted to be elevated at 578. No ketones noted on urine dipstick. No concern for DKA at this time. Additional 1 L normal saline IV fluid bolus ordered. Insulin 10 units IV ordered. Given patient with bilateral flank pain and recent admission for pyelonephritis with possible abscess will obtain CT renal stone protocol. CT w/ findings noted below. Will consult Hospitalist for admission.           Amount and/or Complexity of Data Reviewed  Clinical lab tests: ordered and reviewed  Tests in the radiology section of CPT®: ordered and reviewed  Tests in the medicine section of CPT®: ordered and reviewed  Review and summarize past medical records: yes  Discuss the patient with other providers: yes  Independent visualization of images, tracings, or specimens: yes    Risk of Complications, Morbidity, and/or Mortality  Presenting problems: high  Diagnostic procedures: high  Management options: high    Patient Progress  Patient progress: stable       ED Course as of 12/11/22 2332   Sun Dec 11, 2022   2109 Influenza A Ag: Negative [DF]   2109 Influenza B Ag: Negative [DF]   2109 SOURCE, [id number]: Nasopharyngeal [DF]   2130 Bacteria, UA(!): 4+ [DF]   2130 WBC, UA: 20-50 [DF]   2130 Leukocyte Esterase, Urine(!): MODERATE [DF]   2130 Nitrite, Urine(!): Positive [DF]   2158 WBC(!): 14.9 [DF]   2158 Portable Chest X-ray FINDINGS: The lungs are clear. The cardiac size, mediastinal contour and  pulmonary vasculature are normal. No pneumothorax or pleural effusion is seen. The bones are intact. IMPRESSION: Normal chest x-ray. [DF]   2206 Creatinine(!): 1.35 [DF]   2206 Glucose, Random(!!): 578 [DF]   2206 Sodium(!): 127 [DF]   2330 CT abdomen/pelvis IMPRESSION:  1. Hazy stranding around the left kidney. Differential considerations include  pyelonephritis and sequela of a recently passed stone. 2. Constipation.     [DF]      ED Course User Index  [DF] Alessio Bishop MD        Orders Placed This Encounter   Procedures    Critical Care    Rapid influenza A/B antigens    COVID-19, Rapid    Blood Culture 1    Blood Culture 2    Culture, Urine    XR CHEST PORTABLE    CT ABDOMEN PELVIS RENAL STONE    Urinalysis w rflx microscopic    Comprehensive Metabolic Panel    CBC with Auto Differential    Lactate, Sepsis    Procalcitonin    POCT Urine Dipstick    POC PREGNANCY UR-QUAL    Strict intake and output    POC Pregnancy Urine Qual    POCT Glucose    EKG 12 Lead    Saline lock IV        Medications   0.9 % sodium chloride bolus (1,000 mLs IntraVENous New Bag 12/11/22 2232)   lactated ringers bolus (0 mL/kg × 45.5 kg (Ideal) IntraVENous Stopped 12/11/22 2255)   cefTRIAXone (ROCEPHIN) 1,000 mg in sodium chloride 0.9 % 50 mL IVPB mini-bag (0 mg IntraVENous Stopped 12/11/22 2205)   ondansetron (ZOFRAN) injection 4 mg (4 mg IntraVENous Given 12/11/22 2135)   insulin regular (HUMULIN R;NOVOLIN R) injection 10 Units (10 Units IntraVENous Given 12/11/22 2233)       New Prescriptions    No medications on file        Agustina Bamberger is a 39 y.o. female who presents to the Emergency Department with chief complaint of fatigue, myalgias. Chief Complaint   Patient presents with    Cough    Dizziness    Emesis    Generalized Body Aches      75-year-old female with history of type 2 diabetes mellitus, pyelonephritis presents with complaint of worsening generalized weakness, myalgias since Friday. States that symptoms progressively worsened on Saturday. Patient states that she also recently started with a nonproductive cough 2 weeks ago. Patient reports feeling lightheaded. Patient states she has had decreased p.o. intake over the past 48 hours. Patient states that she had a fever today and took 4 Aleve at around 5 PM.  Patient also states that she took 20 units of Lantus at around 2 PM today. Patient reports nausea, vomiting. Patient denies any abdominal discomfort. Denies diarrhea, shortness of breath. Patient central chest tightness. Denies radiation of pain. Describes discomfort as dull in nature. Uncertain of any recent sick contacts. States that symptoms are exacerbated when she attempts to eat. Denies headache, focal weakness. The history is provided by the patient. The history is limited by a language barrier. A  was used (Offered official language services. Patient declines and wishes for her daughter to translate). Review of Systems   Constitutional:  Positive for chills, fatigue and fever. HENT:  Positive for congestion and rhinorrhea. Negative for sore throat, trouble swallowing and voice change. Eyes:  Negative for photophobia and visual disturbance. Respiratory:  Positive for cough. Negative for shortness of breath, wheezing and stridor. Cardiovascular:  Positive for chest pain. Negative for palpitations and leg swelling. Gastrointestinal:  Positive for nausea and vomiting. Negative for abdominal distention, blood in stool, constipation and diarrhea. Genitourinary:  Positive for dysuria. Negative for flank pain, hematuria, pelvic pain, vaginal bleeding and vaginal discharge. Musculoskeletal:  Positive for myalgias. Negative for neck pain and neck stiffness. Skin:  Negative for rash. Neurological:  Negative for dizziness, syncope, facial asymmetry, weakness, light-headedness and headaches. Hematological:  Does not bruise/bleed easily. Past Medical History:   Diagnosis Date    Diabetes mellitus ([facility] [address]         History reviewed. No pertinent surgical history. History reviewed. No pertinent family history. Social History     Socioeconomic History    Marital status:      Spouse name: None    Number of children: None    Years of education: None    Highest education level: None   Tobacco Use    Smoking status: Never    Smokeless tobacco: Never   Substance and Sexual Activity    Alcohol use: Never    Drug use: Never         Patient has no known allergies. Previous Medications    No medications on file        Vitals signs and nursing note reviewed. Patient Vitals for the past 4 hrs:   Temp Pulse Resp BP SpO2   12/11/22 2230 -- 94 18 (!) 96/53 98 %   12/11/22 2137 -- (!) 101 18 102/60 97 %   12/11/22 2130 -- (!) 102 18 (!) 96/54 97 %   12/11/22 2031 98.3 °F (36.8 °C) (!) 122 17 94/60 96 %          Physical Exam  Vitals and nursing note reviewed. Constitutional:       Appearance: Normal appearance. HENT:      Head: Normocephalic. Right Ear: Tympanic membrane and ear canal normal.      Left Ear: Tympanic membrane and ear canal normal.      Nose: Congestion present. Mouth/Throat:      Mouth: Mucous membranes are moist.      Pharynx: No oropharyngeal exudate or posterior oropharyngeal erythema.    Eyes:      Extraocular Movements: Extraocular movements intact. Pupils: Pupils are equal, round, and reactive to light. Neck:      Comments: No nuchal rigidity. Cardiovascular:      Rate and Rhythm: Tachycardia present. Pulses: Normal pulses. Heart sounds: Normal heart sounds. Pulmonary:      Effort: Pulmonary effort is normal.      Breath sounds: Normal breath sounds. Abdominal:      General: Bowel sounds are normal.      Palpations: Abdomen is soft. Tenderness: There is no abdominal tenderness. There is right CVA tenderness and left CVA tenderness. There is no guarding or rebound. Comments: Soft, nontender, nondistended. No rebound or guarding. Mild bilateral CVAT noted. Musculoskeletal:         General: No deformity. Normal range of motion. Cervical back: Normal range of motion. No rigidity. Comments: No calf tenderness. No lower extremity edema noted. Skin:     General: Skin is warm. Findings: No rash. Neurological:      General: No focal deficit present. Mental Status: She is alert and oriented to person, place, and time. Cranial Nerves: No cranial nerve deficit. Sensory: No sensory deficit. Motor: No weakness. Comments: Strength 5 out of 5 throughout. No focal deficits. No meningismus.    Psychiatric:         Mood and Affect: Mood normal.         Behavior: Behavior normal.        EKG 12 Lead    Date/Time: 12/11/2022 9:33 PM  Performed by: Jenny Samuels MD  Authorized by: Jenny Samuels MD     ECG reviewed by ED Physician in the absence of a cardiologist: yes    Rate:     ECG rate:  102    ECG rate assessment: tachycardic    Rhythm:     Rhythm: sinus tachycardia    Ectopy:     Ectopy: none    QRS:     QRS axis:  Right    QRS intervals:  Normal    QRS conduction: normal    ST segments:     ST segments:  Normal  T waves:     T waves: normal    Critical Care  Performed by: Jenny Samuels MD  Authorized by: Jenny Samuels MD     Critical care provider statement:     Critical care time (minutes):  35    Critical care time was exclusive of:  Separately billable procedures and treating other patients    Critical care was necessary to treat or prevent imminent or life-threatening deterioration of the following conditions:  Sepsis, metabolic crisis, endocrine crisis and dehydration    Critical care was time spent personally by me on the following activities:  Blood draw for specimens, development of treatment plan with patient or surrogate, discussions with consultants, examination of patient, obtaining history from patient or surrogate, evaluation of patient's response to treatment, ordering and performing treatments and interventions, ordering and review of laboratory studies, ordering and review of radiographic studies, pulse oximetry, re-evaluation of patient's condition and review of old charts    I assumed direction of critical care for this patient from another provider in my specialty: no      Care discussed with: admitting provider    Comments:      Patient critically ill with sepsis due to acute left-sided pyelonephritis. Patient also noted to be hyperglycemic with glucose in the 500s. Patient requiring IV antibiotics, IV insulin and multiple boluses of IV fluids. Results for orders placed or performed during the hospital encounter of 12/11/22   Rapid influenza A/B antigens    Specimen: Nasal Washing   Result Value Ref Range    Influenza A Ag Negative NEG      Influenza B Ag Negative NEG      Source Nasopharyngeal     COVID-19, Rapid    Specimen: Nasopharyngeal   Result Value Ref Range    Source Nasopharyngeal      SARS-CoV-2, Rapid Not detected NOTD     XR CHEST PORTABLE    Narrative    EXAM: Chest x-ray. INDICATION: Cough. COMPARISON: None. TECHNIQUE: Single frontal view. FINDINGS: The lungs are clear. The cardiac size, mediastinal contour and  pulmonary vasculature are normal. No pneumothorax or pleural effusion is seen.   The bones are intact. Impression    Normal chest x-ray. CT ABDOMEN PELVIS RENAL STONE    Narrative    EXAM: Noncontrast CT abdomen and pelvis. INDICATION: Abdominal pain. COMPARISON: None. TECHNIQUE: Axial CT images of the abdomen and pelvis were obtained without IV  contrast. Radiation dose reduction techniques were used for this study. Our CT  scanners use one or all of the following:  Automated exposure control,  adjustment of the mA or kV according to patient size, iterative reconstruction. FINDINGS:    - Liver: Within normal limits. - Gallbladder and bile ducts: Within normal limits. - Spleen: Within normal limits. - Urinary tract: There is hazy stranding around the left kidney. The right  kidney and urinary bladder are within normal limits. No urinary tract stone or  hydronephrosis is seen. - Adrenals: Within normal limits. - Pancreas: Within normal limits. - Gastrointestinal tract: Unremarkable except for moderate constipation. A  normal appendix is visualized. - Retroperitoneum: Within normal limits. - Peritoneal cavity and abdominal wall: No ascites or free air.  - Pelvis: Within normal limits. - Spine/bones: No acute process. - Other comments: The lung bases are clear. Impression    1. Hazy stranding around the left kidney. Differential considerations include  pyelonephritis and sequela of a recently passed stone. 2. Constipation.      Urinalysis w rflx microscopic   Result Value Ref Range    Color, UA ORANGE      Appearance CLOUDY      Specific Gravity, UA 1.027 (H) 1.001 - 1.023      pH, Urine 5.0 5.0 - 9.0      Protein,  (A) NEG mg/dL    Glucose, UA >1000 mg/dL    Ketones, Urine Negative NEG mg/dL    Bilirubin Urine SMALL (A) NEG      Blood, Urine TRACE (A) NEG      Urobilinogen, Urine 1.0 0.2 - 1.0 EU/dL    Nitrite, Urine Positive (A) NEG      Leukocyte Esterase, Urine MODERATE (A) NEG      WBC, UA 20-50 0 /hpf    RBC, UA 0-3 0 /hpf    Epithelial Cells UA 5-10 0 /hpf    BACTERIA, URINE 4+ (H) 0 /hpf    Yeast, UA OCCASIONAL     Comprehensive Metabolic Panel   Result Value Ref Range    Sodium 127 (L) 133 - 143 mmol/L    Potassium 4.4 3.5 - 5.1 mmol/L    Chloride 96 (L) 101 - 110 mmol/L    CO2 25 21 - 32 mmol/L    Anion Gap 6 2 - 11 mmol/L    Glucose 578 (HH) 65 - 100 mg/dL    BUN 17 6 - 23 MG/DL    Creatinine 1.35 (H) 0.6 - 1.0 MG/DL    Est, Glom Filt Rate 51 (L) >60 ml/min/1.73m2    Calcium 8.8 8.3 - 10.4 MG/DL    Total Bilirubin 0.6 0.2 - 1.1 MG/DL    ALT 33 12 - 65 U/L    AST 43 (H) 15 - 37 U/L    Alk Phosphatase 141 (H) 50 - 136 U/L    Total Protein 7.5 6.3 - 8.2 g/dL    Albumin 2.7 (L) 3.5 - 5.0 g/dL    Globulin 4.8 (H) 2.8 - 4.5 g/dL    Albumin/Globulin Ratio 0.6 0.4 - 1.6     CBC with Auto Differential   Result Value Ref Range    WBC 14.9 (H) 4.3 - 11.1 K/uL    RBC 3.43 (L) 4.05 - 5.2 M/uL    Hemoglobin 9.8 (L) 11.7 - 15.4 g/dL    Hematocrit 31.1 (L) 35.8 - 46.3 %    MCV 90.7 82.0 - 102.0 FL    MCH 28.6 26.1 - 32.9 PG    MCHC 31.5 31.4 - 35.0 g/dL    RDW 13.5 11.9 - 14.6 %    Platelets 573 162 - 109 K/uL    MPV 9.8 9.4 - 12.3 FL    nRBC 0.00 0.0 - 0.2 K/uL    Differential Type AUTOMATED      Seg Neutrophils 87 (H) 43 - 78 %    Lymphocytes 7 (L) 13 - 44 %    Monocytes 5 4.0 - 12.0 %    Eosinophils % 0 (L) 0.5 - 7.8 %    Basophils 0 0.0 - 2.0 %    Immature Granulocytes 1 0.0 - 5.0 %    Segs Absolute 13.0 (H) 1.7 - 8.2 K/UL    Absolute Lymph # 1.0 0.5 - 4.6 K/UL    Absolute Mono # 0.7 0.1 - 1.3 K/UL    Absolute Eos # 0.0 0.0 - 0.8 K/UL    Basophils Absolute 0.1 0.0 - 0.2 K/UL    Absolute Immature Granulocyte 0.1 0.0 - 0.5 K/UL   Lactate, Sepsis   Result Value Ref Range    Lactic Acid, Sepsis 1.4 0.4 - 2.0 MMOL/L   Procalcitonin   Result Value Ref Range    Procalcitonin 2.63 (H) 0.00 - 0.49 ng/mL   POC Pregnancy Urine Qual   Result Value Ref Range    Preg Test, Ur Negative NEG     EKG 12 Lead   Result Value Ref Range    Ventricular Rate 102 BPM    Atrial Rate 102 BPM P-R Interval 136 ms    QRS Duration 76 ms    Q-T Interval 324 ms    QTc Calculation (Bazett) 422 ms    P Axis 58 degrees    R Axis 109 degrees    T Axis 34 degrees    Diagnosis !! AGE AND GENDER SPECIFIC ECG ANALYSIS !!         CT ABDOMEN PELVIS RENAL STONE   Final Result   1. Hazy stranding around the left kidney. Differential considerations include   pyelonephritis and sequela of a recently passed stone. 2. Constipation. XR CHEST PORTABLE   Final Result   Normal chest x-ray. Voice dictation software was used during the making of this note. This software is not perfect and grammatical and other typographical errors may be present. This note has not been completely proofread for errors.      Constantino Agudelo MD  12/11/22 6554

## 2022-12-12 NOTE — ED TRIAGE NOTES
Pt daughter states pt started c/o generalized body aches on Friday, felt worse Saturday. Pt states she started with a cough two weeks ago, dizziness and vomiting started today. Pt denies abdominal pain, denies diarrhea. Pt ambulatory to triage.  Pt states she took 4 aleve at [phone]

## 2022-12-12 NOTE — PROGRESS NOTES
0000- BP: 85/50. Pt reports feeling nauseous. Fabiola.Brain- MD notified of low BP  0019- New orders received. See MAR.  0021- NS bolus initiated  0035- IV Zofran administered per RN  4956- bolus complete. BP in right arm 85/56. BP in right leg 102/65. Pt asymptomatic of BP  0243-MD notified.

## 2022-12-12 NOTE — PROGRESS NOTES
[facility]  [address] [address]  Phone: [phone]             December 12, 2022      To Whom It May Concern:    Jackie Story was at the bedside assisting in care of her mother at [facility] beginning 12/11/2022 and 12/12/2022. Please call with any questions.       Sincerely,   Sahara Collazo RN         Signature:__________________________________

## 2022-12-12 NOTE — H&P
Hospitalist History and Physical   Admit Date:  2022  8:48 PM   Name:  Sofia Arango   Age:  39 y.o. Sex:  female  :  1980   MRN:  [id number]     Presenting Complaint: general malaise, myalgias  Reason(s) for Admission: Pyelonephritis [N12]  Acute pyelonephritis [N10]  Hyperglycemia [R73.9]  SHERINE (acute kidney injury) ([facility] [address] [N17.9]  Sepsis, due to unspecified organism, unspecified whether acute organ dysfunction present [facility]) [A41.9]     History of Present Illness:   Sofia Arango is a 39 y.o. female with medical history of DM2, HLD who presented to ED with general malaise and myalgias. Symptoms started Friday. Associated nausea/vomiting, fevers, dizziness, elevated BG. Upon ER evaluation, WBC is 14.9. Na is 127 with BG of 578. Anion gap is normal.  Cr is 1.35.  UA is indicative of infection. CT A/P shows:  1. Hazy stranding around the left kidney. Differential considerations include   pyelonephritis and sequela of a recently passed stone. With consideration of abnormal UA, patient diagnosed with pyelonephritis. Hospitalist asked to admit. Review of Systems:  10 systems reviewed and negative except as noted in HPI. Assessment & Plan:   * Pyelonephritis  Assessment & Plan  - UA indicative of infection, CT with evidence of pyelo  - IV rocephin  - Urine and blood cultures obtained  - IVFs    SHERINE (acute kidney injury) ([facility] [address]  Assessment & Plan  - No prior comparison, but Cr is elevated 1.35  - IVFs  - Recheck in AM    Hyponatremia  Assessment & Plan  - Na is 127  - IVFs  - Recheck in AM    Hyperlipidemia, mixed  Assessment & Plan  - Continue home statin    DM2 (diabetes mellitus, type 2) (HCC)  Assessment & Plan  - Elevated BG  - Restart home lantus  - SSI      Disposition: inpatient    Past medical history reviewed. Past Medical History:   Diagnosis Date    Diabetes mellitus ([facility] [address]      Past surgical history reviewed. History reviewed. No pertinent surgical history.    No Known Allergies Social History     Tobacco Use    Smoking status: Never    Smokeless tobacco: Never   Substance Use Topics    Alcohol use: Never      History reviewed. No pertinent family history. Family history reviewed and noncontributory to patient's acute condition; no relevant family history unless otherwise noted above. Immunization History   Administered Date(s) Administered    COVID-19, PFIZER PURPLE top, DILUTE for use, (age 15 y+), 30mcg/0.3mL 04/03/2021, 04/24/2021, 11/11/2021     PTA Medications:  Current Outpatient Medications   Medication Instructions    atorvastatin (LIPITOR) 40 mg, Oral, NIGHTLY    ferrous sulfate (IRON 325) 325 mg, Oral, DAILY    Lantus SoloStar 20 Units, SubCUTAneous, DAILY BEFORE BREAKFAST       Objective:   Patient Vitals for the past 24 hrs:   Temp Pulse Resp BP SpO2   12/12/22 0412 98.4 °F (36.9 °C) 96 18 91/60 96 %   12/12/22 0240 98.1 °F (36.7 °C) 100 17 102/65 97 %   12/12/22 0237 -- 98 -- (!) 85/56 --   12/12/22 0000 98.4 °F (36.9 °C) 99 18 (!) 85/50 99 %   12/11/22 2357 -- 92 18 105/64 98 %   12/11/22 2343 -- 92 18 97/60 97 %   12/11/22 2230 -- 94 18 (!) 96/53 98 %   12/11/22 2137 -- (!) 101 18 102/60 97 %   12/11/22 2130 -- (!) 102 18 (!) 96/54 97 %   12/11/22 2031 98.3 °F (36.8 °C) (!) 122 17 94/60 96 %          Estimated body mass index is 26.37 kg/m² as calculated from the following:    Height as of this encounter: 5' (1.524 m). Weight as of this encounter: 135 lb (61.2 kg). No intake or output data in the 24 hours ending 12/12/22 0559      Physical Exam:  General:    Well nourished. No overt distress  Head:  Normocephalic, atraumatic  Eyes:  Sclerae appear normal.  Pupils equally round. HENT:  Nares appear normal, no drainage. Moist mucous membranes  Neck:  No restricted ROM. Trachea midline  CV:   RRR. S1/S2 auscultated  Lungs:   CTAB. No wheezing, rhonchi, or rales. Appears even, unlabored  Abdomen: Bowel sounds present. Soft, nontender, nondistended. Extremities: Warm and dry. No cyanosis or clubbing. No edema. Skin:     No rashes. Normal turgor. Normal coloration  Neuro:  Cranial nerves II-XII grossly intact. Sensation intact  Psych:  Normal mood and affect.   Alert and oriented x3    Data Ordered and Personally Reviewed:    Last 24hr Labs:  Recent Results (from the past 24 hour(s))   Rapid influenza A/B antigens    Collection Time: 12/11/22  8:37 PM    Specimen: Nasal Washing   Result Value Ref Range    Influenza A Ag Negative NEG      Influenza B Ag Negative NEG      Source Nasopharyngeal     COVID-19, Rapid    Collection Time: 12/11/22  8:37 PM    Specimen: Nasopharyngeal   Result Value Ref Range    Source Nasopharyngeal      SARS-CoV-2, Rapid Not detected NOTD     POC Pregnancy Urine Qual    Collection Time: 12/11/22  8:57 PM   Result Value Ref Range    Preg Test, Ur Negative NEG     Urinalysis w rflx microscopic    Collection Time: 12/11/22  8:58 PM   Result Value Ref Range    Color, UA ORANGE      Appearance CLOUDY      Specific Gravity, UA 1.027 (H) 1.001 - 1.023      pH, Urine 5.0 5.0 - 9.0      Protein,  (A) NEG mg/dL    Glucose, UA >1000 mg/dL    Ketones, Urine Negative NEG mg/dL    Bilirubin Urine SMALL (A) NEG      Blood, Urine TRACE (A) NEG      Urobilinogen, Urine 1.0 0.2 - 1.0 EU/dL    Nitrite, Urine Positive (A) NEG      Leukocyte Esterase, Urine MODERATE (A) NEG      WBC, UA 20-50 0 /hpf    RBC, UA 0-3 0 /hpf    Epithelial Cells UA 5-10 0 /hpf    BACTERIA, URINE 4+ (H) 0 /hpf    Yeast, UA OCCASIONAL     Comprehensive Metabolic Panel    Collection Time: 12/11/22  9:25 PM   Result Value Ref Range    Sodium 127 (L) 133 - 143 mmol/L    Potassium 4.4 3.5 - 5.1 mmol/L    Chloride 96 (L) 101 - 110 mmol/L    CO2 25 21 - 32 mmol/L    Anion Gap 6 2 - 11 mmol/L    Glucose 578 (HH) 65 - 100 mg/dL    BUN 17 6 - 23 MG/DL    Creatinine 1.35 (H) 0.6 - 1.0 MG/DL    Est, Glom Filt Rate 51 (L) >60 ml/min/1.73m2    Calcium 8.8 8.3 - 10.4 MG/DL Total Bilirubin 0.6 0.2 - 1.1 MG/DL    ALT 33 12 - 65 U/L    AST 43 (H) 15 - 37 U/L    Alk Phosphatase 141 (H) 50 - 136 U/L    Total Protein 7.5 6.3 - 8.2 g/dL    Albumin 2.7 (L) 3.5 - 5.0 g/dL    Globulin 4.8 (H) 2.8 - 4.5 g/dL    Albumin/Globulin Ratio 0.6 0.4 - 1.6     CBC with Auto Differential    Collection Time: 12/11/22  9:25 PM   Result Value Ref Range    WBC 14.9 (H) 4.3 - 11.1 K/uL    RBC 3.43 (L) 4.05 - 5.2 M/uL    Hemoglobin 9.8 (L) 11.7 - 15.4 g/dL    Hematocrit 31.1 (L) 35.8 - 46.3 %    MCV 90.7 82.0 - 102.0 FL    MCH 28.6 26.1 - 32.9 PG    MCHC 31.5 31.4 - 35.0 g/dL    RDW 13.5 11.9 - 14.6 %    Platelets 198 145 - 893 K/uL    MPV 9.8 9.4 - 12.3 FL    nRBC 0.00 0.0 - 0.2 K/uL    Differential Type AUTOMATED      Seg Neutrophils 87 (H) 43 - 78 %    Lymphocytes 7 (L) 13 - 44 %    Monocytes 5 4.0 - 12.0 %    Eosinophils % 0 (L) 0.5 - 7.8 %    Basophils 0 0.0 - 2.0 %    Immature Granulocytes 1 0.0 - 5.0 %    Segs Absolute 13.0 (H) 1.7 - 8.2 K/UL    Absolute Lymph # 1.0 0.5 - 4.6 K/UL    Absolute Mono # 0.7 0.1 - 1.3 K/UL    Absolute Eos # 0.0 0.0 - 0.8 K/UL    Basophils Absolute 0.1 0.0 - 0.2 K/UL    Absolute Immature Granulocyte 0.1 0.0 - 0.5 K/UL   Lactate, Sepsis    Collection Time: 12/11/22  9:25 PM   Result Value Ref Range    Lactic Acid, Sepsis 1.4 0.4 - 2.0 MMOL/L   Procalcitonin    Collection Time: 12/11/22  9:25 PM   Result Value Ref Range    Procalcitonin 2.63 (H) 0.00 - 0.49 ng/mL   EKG 12 Lead    Collection Time: 12/11/22  9:29 PM   Result Value Ref Range    Ventricular Rate 102 BPM    Atrial Rate 102 BPM    P-R Interval 136 ms    QRS Duration 76 ms    Q-T Interval 324 ms    QTc Calculation (Bazett) 422 ms    P Axis 58 degrees    R Axis 109 degrees    T Axis 34 degrees    Diagnosis !! AGE AND GENDER SPECIFIC ECG ANALYSIS !!    POCT Glucose    Collection Time: 12/11/22 11:34 PM   Result Value Ref Range    POC Glucose 275 (H) 65 - 100 mg/dL    Performed by: Marciano    Lactate, Sepsis Collection Time: 12/12/22  3:51 AM   Result Value Ref Range    Lactic Acid, Sepsis 0.9 0.4 - 2.0 MMOL/L       Signed:  Aiden Maharaj MD

## 2022-12-12 NOTE — PROGRESS NOTES
Hospitalist Progress Note   Admit Date:  2022  8:48 PM   Name:  Lance Cuenca   Age:  39 y.o. Sex:  female  :  1980   MRN:  [id number]   Room:  [facility]/    Presenting Complaint: Cough, Dizziness, Emesis, and Generalized Body Aches     Reason(s) for Admission: Pyelonephritis [N12]  Acute pyelonephritis [N10]  Hyperglycemia [R73.9]  SHERINE (acute kidney injury) ([facility] [address] [N17.9]  Sepsis, due to unspecified organism, unspecified whether acute organ dysfunction present [facility]) [A41.9]     Hospital Course:   Ms. Litzy Brown is a 40 yo female with PMH of DM 2, HLD, who presented with c/o generalized malaise and myalgias. C/o n/v, fevers, dizziness. BGL elevated. Found to have leukocytosis, hypernatremia (however normal for corrected glucose), . Creatinine 1.35 no baseline, UA +infection. CT abd showed concern for pyelonephritis. Rocephin started. Subjective & 24hr Events (22): Pt states she does not need an  when I was at the bedside. She repeated back in English what I had gone over with her. She also is aware she can ask for an  if she felt like she needed one. BC +GNR. Denies CP, SOB, n/v/d, abd pain. Addendum:  BC with e.coli and enterobacteriaceae. Consult ID    Updated daughter via phone    Assessment & Plan:     Principal Problem:    Pyelonephritis/bacteremia  On IV rocephin  BC with +GNR  Redraw BC in AM  Increase rocephin to 2 gm  Follow urine cx    Active Problems:    DM2 (diabetes mellitus, type 2) ([facility])  Check A1C  Home lantus  SSI      Hyperlipidemia, mixed  Home statin      Hyponatremia  Resolved  Corrected for hyperglycemia as normal      SHERINE (acute kidney injury) ([facility] [address]  IVF  Renal function improving      Anticipated discharge needs:      Pending clinical course, none    Diet:  ADULT DIET;  Regular; 4 carb choices (60 gm/meal)    Code status: Full Code    Hospital Problems:  Principal Problem:    Pyelonephritis  Active Problems:    DM2 (diabetes mellitus, type 2) [facility]    Hyperlipidemia, mixed    Hyponatremia    SHERINE (acute kidney injury) ([facility]  Resolved Problems:    * No resolved hospital problems. *      Objective:   Patient Vitals for the past 24 hrs:   Temp Pulse Resp BP SpO2   12/12/22 1111 98.6 °F (37 °C) (!) 101 16 107/67 96 %   12/12/22 0723 98.2 °F (36.8 °C) 94 16 91/61 98 %   12/12/22 0412 98.4 °F (36.9 °C) 96 18 91/60 96 %   12/12/22 0240 98.1 °F (36.7 °C) 100 17 102/65 97 %   12/12/22 0237 -- 98 -- (!) 85/56 --   12/12/22 0000 98.4 °F (36.9 °C) 99 18 (!) 85/50 99 %   12/11/22 2357 -- 92 18 105/64 98 %   12/11/22 2343 -- 92 18 97/60 97 %   12/11/22 2230 -- 94 18 (!) 96/53 98 %   12/11/22 2137 -- (!) 101 18 102/60 97 %   12/11/22 2130 -- (!) 102 18 (!) 96/54 97 %   12/11/22 2031 98.3 °F (36.8 °C) (!) 122 17 94/60 96 %       Oxygen Therapy  SpO2: 96 %  O2 Device: None (Room air)    Estimated body mass index is 26.37 kg/m² as calculated from the following:    Height as of this encounter: 5' (1.524 m). Weight as of this encounter: 135 lb (61.2 kg). No intake or output data in the 24 hours ending 12/12/22 1254      Physical Exam:     Blood pressure 107/67, pulse (!) 101, temperature 98.6 °F (37 °C), temperature source Oral, resp. rate 16, height 5' (1.524 m), weight 135 lb (61.2 kg), SpO2 96 %. General:    Well nourished. Head:  Normocephalic, atraumatic  Eyes:  Sclerae appear normal.  Pupils equally round. ENT:  Nares appear normal, no drainage. Moist oral mucosa  Neck:  No restricted ROM. Trachea midline   CV:   RRR. No m/r/g. No jugular venous distension. Lungs:   CTAB. No wheezing, rhonchi, or rales. Symmetric expansion. Abdomen: Bowel sounds present. Soft, nontender, nondistended. Bilateral CVA tenderness   Extremities: No cyanosis or clubbing. No edema  Skin:     No rashes and normal coloration. Warm and dry. Neuro:  CN II-XII grossly intact. Sensation intact. A&Ox3  Psych:  Normal mood and affect.       I have personally reviewed labs and tests showing:  Recent Labs:  Recent Results (from the past 48 hour(s))   Rapid influenza A/B antigens    Collection Time: 12/11/22  8:37 PM    Specimen: Nasal Washing   Result Value Ref Range    Influenza A Ag Negative NEG      Influenza B Ag Negative NEG      Source Nasopharyngeal     COVID-19, Rapid    Collection Time: 12/11/22  8:37 PM    Specimen: Nasopharyngeal   Result Value Ref Range    Source Nasopharyngeal      SARS-CoV-2, Rapid Not detected NOTD     POC Pregnancy Urine Qual    Collection Time: 12/11/22  8:57 PM   Result Value Ref Range    Preg Test, Ur Negative NEG     Urinalysis w rflx microscopic    Collection Time: 12/11/22  8:58 PM   Result Value Ref Range    Color, UA ORANGE      Appearance CLOUDY      Specific Gravity, UA 1.027 (H) 1.001 - 1.023      pH, Urine 5.0 5.0 - 9.0      Protein,  (A) NEG mg/dL    Glucose, UA >1000 mg/dL    Ketones, Urine Negative NEG mg/dL    Bilirubin Urine SMALL (A) NEG      Blood, Urine TRACE (A) NEG      Urobilinogen, Urine 1.0 0.2 - 1.0 EU/dL    Nitrite, Urine Positive (A) NEG      Leukocyte Esterase, Urine MODERATE (A) NEG      WBC, UA 20-50 0 /hpf    RBC, UA 0-3 0 /hpf    Epithelial Cells UA 5-10 0 /hpf    BACTERIA, URINE 4+ (H) 0 /hpf    Yeast, UA OCCASIONAL     Culture, Urine    Collection Time: 12/11/22  8:58 PM    Specimen: Urine, clean catch   Result Value Ref Range    Special Requests NO SPECIAL REQUESTS      Culture        No growth after short period of incubation. Further results to follow after overnight incubation.    Comprehensive Metabolic Panel    Collection Time: 12/11/22  9:25 PM   Result Value Ref Range    Sodium 127 (L) 133 - 143 mmol/L    Potassium 4.4 3.5 - 5.1 mmol/L    Chloride 96 (L) 101 - 110 mmol/L    CO2 25 21 - 32 mmol/L    Anion Gap 6 2 - 11 mmol/L    Glucose 578 (HH) 65 - 100 mg/dL    BUN 17 6 - 23 MG/DL    Creatinine 1.35 (H) 0.6 - 1.0 MG/DL    Est, Glom Filt Rate 51 (L) >60 ml/min/1.73m2    Calcium 8.8 8.3 - 10.4 MG/DL    Total Bilirubin 0.6 0.2 - 1.1 MG/DL    ALT 33 12 - 65 U/L    AST 43 (H) 15 - 37 U/L    Alk Phosphatase 141 (H) 50 - 136 U/L    Total Protein 7.5 6.3 - 8.2 g/dL    Albumin 2.7 (L) 3.5 - 5.0 g/dL    Globulin 4.8 (H) 2.8 - 4.5 g/dL    Albumin/Globulin Ratio 0.6 0.4 - 1.6     Blood Culture 1    Collection Time: 12/11/22  9:25 PM    Specimen: Blood   Result Value Ref Range    Special Requests NO SPECIAL REQUESTS  LEFT  HAND        Gram stain GRAM NEGATIVE RODS      Gram stain ANAEROBIC BOTTLE POSITIVE      Gram stain        RESULTS VERIFIED, PHONED TO AND READ BACK BY .DEVEN ON 12/12/22 @ 0935 BY     Culture CULTURE IN PROGRESS,FURTHER UPDATES TO FOLLOW     Blood Culture 2    Collection Time: 12/11/22  9:25 PM    Specimen: Blood   Result Value Ref Range    Special Requests NO SPECIAL REQUESTS  RIGHT  Antecubital        Gram stain GRAM NEGATIVE RODS      Gram stain ANAEROBIC BOTTLE POSITIVE      Gram stain        CRITICAL RESULT NOT CALLED DUE TO PREVIOUS NOTIFICATION OF CRITICAL RESULT WITHIN THE LAST 24 HOURS.     Culture CULTURE IN PROGRESS,FURTHER UPDATES TO FOLLOW     CBC with Auto Differential    Collection Time: 12/11/22  9:25 PM   Result Value Ref Range    WBC 14.9 (H) 4.3 - 11.1 K/uL    RBC 3.43 (L) 4.05 - 5.2 M/uL    Hemoglobin 9.8 (L) 11.7 - 15.4 g/dL    Hematocrit 31.1 (L) 35.8 - 46.3 %    MCV 90.7 82.0 - 102.0 FL    MCH 28.6 26.1 - 32.9 PG    MCHC 31.5 31.4 - 35.0 g/dL    RDW 13.5 11.9 - 14.6 %    Platelets 007 309 - 889 K/uL    MPV 9.8 9.4 - 12.3 FL    nRBC 0.00 0.0 - 0.2 K/uL    Differential Type AUTOMATED      Seg Neutrophils 87 (H) 43 - 78 %    Lymphocytes 7 (L) 13 - 44 %    Monocytes 5 4.0 - 12.0 %    Eosinophils % 0 (L) 0.5 - 7.8 %    Basophils 0 0.0 - 2.0 %    Immature Granulocytes 1 0.0 - 5.0 %    Segs Absolute 13.0 (H) 1.7 - 8.2 K/UL    Absolute Lymph # 1.0 0.5 - 4.6 K/UL    Absolute Mono # 0.7 0.1 - 1.3 K/UL    Absolute Eos # 0.0 0.0 - 0.8 K/UL    Basophils Absolute 0.1 0.0 - 0.2 K/UL    Absolute Immature Granulocyte 0.1 0.0 - 0.5 K/UL   Lactate, Sepsis    Collection Time: 12/11/22  9:25 PM   Result Value Ref Range    Lactic Acid, Sepsis 1.4 0.4 - 2.0 MMOL/L   Procalcitonin    Collection Time: 12/11/22  9:25 PM   Result Value Ref Range    Procalcitonin 2.63 (H) 0.00 - 0.49 ng/mL   EKG 12 Lead    Collection Time: 12/11/22  9:29 PM   Result Value Ref Range    Ventricular Rate 102 BPM    Atrial Rate 102 BPM    P-R Interval 136 ms    QRS Duration 76 ms    Q-T Interval 324 ms    QTc Calculation (Bazett) 422 ms    P Axis 58 degrees    R Axis 109 degrees    T Axis 34 degrees    Diagnosis !! AGE AND GENDER SPECIFIC ECG ANALYSIS !!    POCT Glucose    Collection Time: 12/11/22 11:34 PM   Result Value Ref Range    POC Glucose 275 (H) 65 - 100 mg/dL    Performed by: Marciano    Lactate, Sepsis    Collection Time: 12/12/22  3:51 AM   Result Value Ref Range    Lactic Acid, Sepsis 0.9 0.4 - 2.0 MMOL/L   POCT Glucose    Collection Time: 12/12/22  6:25 AM   Result Value Ref Range    POC Glucose 209 (H) 65 - 100 mg/dL    Performed by: Heriberto Sy    CBC with Auto Differential    Collection Time: 12/12/22 10:05 AM   Result Value Ref Range    WBC 10.6 4.3 - 11.1 K/uL    RBC 2.91 (L) 4.05 - 5.2 M/uL    Hemoglobin 8.5 (L) 11.7 - 15.4 g/dL    Hematocrit 26.4 (L) 35.8 - 46.3 %    MCV 90.7 82.0 - 102.0 FL    MCH 29.2 26.1 - 32.9 PG    MCHC 32.2 31.4 - 35.0 g/dL    RDW 13.6 11.9 - 14.6 %    Platelets 467 308 - 041 K/uL    MPV 9.6 9.4 - 12.3 FL    nRBC 0.00 0.0 - 0.2 K/uL    Differential Type AUTOMATED      Seg Neutrophils 87 (H) 43 - 78 %    Lymphocytes 8 (L) 13 - 44 %    Monocytes 4 4.0 - 12.0 %    Eosinophils % 0 (L) 0.5 - 7.8 %    Basophils 0 0.0 - 2.0 %    Immature Granulocytes 1 0.0 - 5.0 %    Segs Absolute 9.2 (H) 1.7 - 8.2 K/UL    Absolute Lymph # 0.8 0.5 - 4.6 K/UL    Absolute Mono # 0.4 0.1 - 1.3 K/UL    Absolute Eos # 0.0 0.0 - 0.8 K/UL    Basophils Absolute 0.0 0.0 - 0.2 K/UL Absolute Immature Granulocyte 0.1 0.0 - 0.5 K/UL   Comprehensive Metabolic Panel w/ Reflex to MG    Collection Time: 12/12/22 10:05 AM   Result Value Ref Range    Sodium 139 133 - 143 mmol/L    Potassium 3.7 3.5 - 5.1 mmol/L    Chloride 110 101 - 110 mmol/L    CO2 25 21 - 32 mmol/L    Anion Gap 4 2 - 11 mmol/L    Glucose 256 (H) 65 - 100 mg/dL    BUN 13 6 - 23 MG/DL    Creatinine 0.76 0.6 - 1.0 MG/DL    Est, Glom Filt Rate >60 >60 ml/min/1.73m2    Calcium 7.8 (L) 8.3 - 10.4 MG/DL    Total Bilirubin 0.4 0.2 - 1.1 MG/DL    ALT 26 12 - 65 U/L    AST 20 15 - 37 U/L    Alk Phosphatase 117 50 - 130 U/L    Total Protein 6.3 6.3 - 8.2 g/dL    Albumin 2.1 (L) 3.5 - 5.0 g/dL    Globulin 4.2 2.8 - 4.5 g/dL    Albumin/Globulin Ratio 0.5 0.4 - 1.6     POCT Glucose    Collection Time: 12/12/22 11:12 AM   Result Value Ref Range    POC Glucose 248 (H) 65 - 100 mg/dL    Performed by: Mendel Hunter        I have personally reviewed imaging studies showing: Other Studies:  CT ABDOMEN PELVIS RENAL STONE   Final Result   1. Hazy stranding around the left kidney. Differential considerations include   pyelonephritis and sequela of a recently passed stone. 2. Constipation. XR CHEST PORTABLE   Final Result   Normal chest x-ray.           Current Meds:  Current Facility-Administered Medications   Medication Dose Route Frequency    atorvastatin (LIPITOR) tablet 20 mg  20 mg Oral Nightly    ferrous sulfate (IRON 325) tablet 325 mg  325 mg Oral Daily    insulin glargine (LANTUS) injection vial 20 Units  20 Units SubCUTAneous QAM AC    sodium chloride flush 0.9 % injection 5-40 mL  5-40 mL IntraVENous 2 times per day    sodium chloride flush 0.9 % injection 5-40 mL  5-40 mL IntraVENous PRN    0.9 % sodium chloride infusion   IntraVENous PRN    enoxaparin (LOVENOX) injection 40 mg  40 mg SubCUTAneous Daily    ondansetron (ZOFRAN-ODT) disintegrating tablet 4 mg  4 mg Oral Q8H PRN    Or    ondansetron (ZOFRAN) injection 4 mg  4 mg IntraVENous Q6H PRN    polyethylene glycol (GLYCOLAX) packet 17 g  17 g Oral Daily PRN    acetaminophen (TYLENOL) tablet 650 mg  650 mg Oral Q6H PRN    Or    acetaminophen (TYLENOL) suppository 650 mg  650 mg Rectal Q6H PRN    0.9 % sodium chloride infusion   IntraVENous Continuous    cefTRIAXone (ROCEPHIN) 1,000 mg in sodium chloride 0.9 % 50 mL IVPB mini-bag  1,000 mg IntraVENous Q24H    insulin lispro (HUMALOG) injection vial 0-8 Units  0-8 Units SubCUTAneous TID WC    insulin lispro (HUMALOG) injection vial 0-4 Units  0-4 Units SubCUTAneous Nightly       Signed:  Kalpana Arita, APRN - CNP    Notes, labs, VS, diagnostic testing reviewed  Case discussed with pt, care team ,Dr. Susanna Winter

## 2022-12-13 LAB
ALBUMIN SERPL-MCNC: 1.8 G/DL (ref 3.5–5)
ALBUMIN/GLOB SERPL: 0.5 {RATIO} (ref 0.4–1.6)
ALP SERPL-CCNC: 183 U/L (ref 50–136)
ALT SERPL-CCNC: 48 U/L (ref 12–65)
ANION GAP SERPL CALC-SCNC: 6 MMOL/L (ref 2–11)
AST SERPL-CCNC: 45 U/L (ref 15–37)
BACTERIA SPEC CULT: NORMAL
BACTERIA SPEC CULT: NORMAL
BASOPHILS # BLD: 0 K/UL (ref 0–0.2)
BASOPHILS NFR BLD: 0 % (ref 0–2)
BILIRUB SERPL-MCNC: 0.1 MG/DL (ref 0.2–1.1)
BUN SERPL-MCNC: 9 MG/DL (ref 6–23)
CALCIUM SERPL-MCNC: 7.4 MG/DL (ref 8.3–10.4)
CHLORIDE SERPL-SCNC: 109 MMOL/L (ref 101–110)
CO2 SERPL-SCNC: 24 MMOL/L (ref 21–32)
CREAT SERPL-MCNC: 0.61 MG/DL (ref 0.6–1)
DIFFERENTIAL METHOD BLD: ABNORMAL
EOSINOPHIL # BLD: 0.1 K/UL (ref 0–0.8)
EOSINOPHIL NFR BLD: 1 % (ref 0.5–7.8)
ERYTHROCYTE [DISTWIDTH] IN BLOOD BY AUTOMATED COUNT: 14 % (ref 11.9–14.6)
EST. AVERAGE GLUCOSE BLD GHB EST-MCNC: 255 MG/DL
GLOBULIN SER CALC-MCNC: 4 G/DL (ref 2.8–4.5)
GLUCOSE BLD STRIP.AUTO-MCNC: 192 MG/DL (ref 65–100)
GLUCOSE BLD STRIP.AUTO-MCNC: 255 MG/DL (ref 65–100)
GLUCOSE BLD STRIP.AUTO-MCNC: 310 MG/DL (ref 65–100)
GLUCOSE BLD STRIP.AUTO-MCNC: 315 MG/DL (ref 65–100)
GLUCOSE SERPL-MCNC: 209 MG/DL (ref 65–100)
HBA1C MFR BLD: 10.5 % (ref 4.8–5.6)
HCT VFR BLD AUTO: 24.6 % (ref 35.8–46.3)
HGB BLD-MCNC: 7.7 G/DL (ref 11.7–15.4)
IMM GRANULOCYTES # BLD AUTO: 0.1 K/UL (ref 0–0.5)
IMM GRANULOCYTES NFR BLD AUTO: 1 % (ref 0–5)
LYMPHOCYTES # BLD: 1.3 K/UL (ref 0.5–4.6)
LYMPHOCYTES NFR BLD: 15 % (ref 13–44)
MCH RBC QN AUTO: 28.7 PG (ref 26.1–32.9)
MCHC RBC AUTO-ENTMCNC: 31.3 G/DL (ref 31.4–35)
MCV RBC AUTO: 91.8 FL (ref 82–102)
MONOCYTES # BLD: 0.5 K/UL (ref 0.1–1.3)
MONOCYTES NFR BLD: 6 % (ref 4–12)
NEUTS SEG # BLD: 6.6 K/UL (ref 1.7–8.2)
NEUTS SEG NFR BLD: 77 % (ref 43–78)
NRBC # BLD: 0 K/UL (ref 0–0.2)
PLATELET # BLD AUTO: 273 K/UL (ref 150–450)
PMV BLD AUTO: 9.9 FL (ref 9.4–12.3)
POTASSIUM SERPL-SCNC: 3.6 MMOL/L (ref 3.5–5.1)
PROT SERPL-MCNC: 5.8 G/DL (ref 6.3–8.2)
RBC # BLD AUTO: 2.68 M/UL (ref 4.05–5.2)
SERVICE CMNT-IMP: ABNORMAL
SERVICE CMNT-IMP: NORMAL
SODIUM SERPL-SCNC: 139 MMOL/L (ref 133–143)
WBC # BLD AUTO: 8.6 K/UL (ref 4.3–11.1)

## 2022-12-13 PROCEDURE — 2580000003 HC RX 258: Performed by: NURSE PRACTITIONER

## 2022-12-13 PROCEDURE — 80053 COMPREHEN METABOLIC PANEL: CPT

## 2022-12-13 PROCEDURE — 6360000002 HC RX W HCPCS: Performed by: FAMILY MEDICINE

## 2022-12-13 PROCEDURE — 2580000003 HC RX 258: Performed by: FAMILY MEDICINE

## 2022-12-13 PROCEDURE — 85025 COMPLETE CBC W/AUTO DIFF WBC: CPT

## 2022-12-13 PROCEDURE — 1100000000 HC RM PRIVATE

## 2022-12-13 PROCEDURE — 82962 GLUCOSE BLOOD TEST: CPT

## 2022-12-13 PROCEDURE — 87040 BLOOD CULTURE FOR BACTERIA: CPT

## 2022-12-13 PROCEDURE — 6360000002 HC RX W HCPCS: Performed by: NURSE PRACTITIONER

## 2022-12-13 PROCEDURE — 83036 HEMOGLOBIN GLYCOSYLATED A1C: CPT

## 2022-12-13 PROCEDURE — 6370000000 HC RX 637 (ALT 250 FOR IP): Performed by: NURSE PRACTITIONER

## 2022-12-13 PROCEDURE — 6370000000 HC RX 637 (ALT 250 FOR IP): Performed by: FAMILY MEDICINE

## 2022-12-13 PROCEDURE — 36415 COLL VENOUS BLD VENIPUNCTURE: CPT

## 2022-12-13 RX ORDER — DEXTROSE MONOHYDRATE 100 MG/ML
INJECTION, SOLUTION INTRAVENOUS CONTINUOUS PRN
Status: DISCONTINUED | OUTPATIENT
Start: 2022-12-13 | End: 2022-12-15 | Stop reason: HOSPADM

## 2022-12-13 RX ORDER — INSULIN GLARGINE 100 [IU]/ML
4 INJECTION, SOLUTION SUBCUTANEOUS ONCE
Status: COMPLETED | OUTPATIENT
Start: 2022-12-13 | End: 2022-12-13

## 2022-12-13 RX ORDER — INSULIN GLARGINE 100 [IU]/ML
24 INJECTION, SOLUTION SUBCUTANEOUS
Status: DISCONTINUED | OUTPATIENT
Start: 2022-12-14 | End: 2022-12-15 | Stop reason: HOSPADM

## 2022-12-13 RX ADMIN — SODIUM CHLORIDE: 9 INJECTION, SOLUTION INTRAVENOUS at 05:09

## 2022-12-13 RX ADMIN — LACTOBACILLUS TAB 4 TABLET: TAB at 21:58

## 2022-12-13 RX ADMIN — CEFTRIAXONE 2000 MG: 2 INJECTION, POWDER, FOR SOLUTION INTRAMUSCULAR; INTRAVENOUS at 21:58

## 2022-12-13 RX ADMIN — ATORVASTATIN CALCIUM 20 MG: 40 TABLET, FILM COATED ORAL at 21:58

## 2022-12-13 RX ADMIN — ENOXAPARIN SODIUM 40 MG: 100 INJECTION SUBCUTANEOUS at 07:51

## 2022-12-13 RX ADMIN — FERROUS SULFATE TAB 325 MG (65 MG ELEMENTAL FE) 325 MG: 325 (65 FE) TAB at 07:52

## 2022-12-13 RX ADMIN — INSULIN GLARGINE 4 UNITS: 100 INJECTION, SOLUTION SUBCUTANEOUS at 13:18

## 2022-12-13 RX ADMIN — INSULIN LISPRO 4 UNITS: 100 INJECTION, SOLUTION INTRAVENOUS; SUBCUTANEOUS at 22:12

## 2022-12-13 RX ADMIN — INSULIN LISPRO 6 UNITS: 100 INJECTION, SOLUTION INTRAVENOUS; SUBCUTANEOUS at 16:31

## 2022-12-13 RX ADMIN — INSULIN GLARGINE 20 UNITS: 100 INJECTION, SOLUTION SUBCUTANEOUS at 07:50

## 2022-12-13 RX ADMIN — HYDROCODONE BITARTRATE AND ACETAMINOPHEN 1 TABLET: 5; 325 TABLET ORAL at 17:51

## 2022-12-13 RX ADMIN — SODIUM CHLORIDE, PRESERVATIVE FREE 10 ML: 5 INJECTION INTRAVENOUS at 21:59

## 2022-12-13 RX ADMIN — LACTOBACILLUS TAB 4 TABLET: TAB at 13:19

## 2022-12-13 RX ADMIN — SODIUM CHLORIDE, PRESERVATIVE FREE 10 ML: 5 INJECTION INTRAVENOUS at 07:52

## 2022-12-13 RX ADMIN — INSULIN LISPRO 4 UNITS: 100 INJECTION, SOLUTION INTRAVENOUS; SUBCUTANEOUS at 11:46

## 2022-12-13 RX ADMIN — LACTOBACILLUS TAB 4 TABLET: TAB at 07:51

## 2022-12-13 ASSESSMENT — PAIN DESCRIPTION - ORIENTATION: ORIENTATION: LEFT

## 2022-12-13 ASSESSMENT — PAIN DESCRIPTION - DESCRIPTORS: DESCRIPTORS: SHOOTING

## 2022-12-13 ASSESSMENT — PAIN SCALES - GENERAL
PAINLEVEL_OUTOF10: 2
PAINLEVEL_OUTOF10: 5
PAINLEVEL_OUTOF10: 4

## 2022-12-13 ASSESSMENT — PAIN DESCRIPTION - LOCATION: LOCATION: FLANK

## 2022-12-13 NOTE — PROGRESS NOTES
Hospitalist Progress Note   Admit Date:  2022  8:48 PM   Name:  Douglas Hedrick   Age:  39 y.o. Sex:  female  :  1980   MRN:  [id number]   Room:  [facility]    Presenting Complaint: Cough, Dizziness, Emesis, and Generalized Body Aches     Reason(s) for Admission: Pyelonephritis [N12]  Acute pyelonephritis [N10]  Hyperglycemia [R73.9]  SHERINE (acute kidney injury) ([facility] [address] [N17.9]  Sepsis, due to unspecified organism, unspecified whether acute organ dysfunction present [facility]) [A41.9]     Hospital Course:   Ms. Glenna Armstrong is a 38 yo female with PMH of DM 2, HLD, who presented with c/o generalized malaise and myalgias. C/o n/v, fevers, dizziness. BGL elevated. Found to have leukocytosis, hypernatremia (however normal for corrected glucose), . Creatinine 1.35 no baseline, UA +infection. CT abd showed concern for pyelonephritis. Rocephin started. BC + e coli. Subjective & 24hr Events (22): A&O x3, afebrile. Repeat BC drawn today. Assessment & Plan:     Principal Problem:  E coli  Pyelonephritis/bacteremia  On IV rocephin  BC with E coli, repeat done today  continue rocephin to 2 gm  Follow urine cx  **1 set BC gram positive cocci not identified on panel, likely contaminate. Active Problems:    DM2 (diabetes mellitus, type 2) ([facility])  Blood sugars elevated; Check A1C  Home lantus increase to 24 units daily with additional 4 units today  SSI      Hyperlipidemia, mixed  Home statin      Hyponatremia  Resolved        SHERINE (acute kidney injury) ([facility] [address]  resolved      Anticipated discharge needs:    none    Diet:  ADULT DIET; Regular; 4 carb choices (60 gm/meal)    Code status: Full Code    Hospital Problems:  Principal Problem:    Pyelonephritis  Active Problems:    DM2 (diabetes mellitus, type 2) ([facility] [address]    Hyperlipidemia, mixed    Hyponatremia    SHERINE (acute kidney injury) ([facility] [address]  Resolved Problems:    * No resolved hospital problems.  *      Objective:   Patient Vitals for the past 24 hrs:   Temp Pulse Resp BP SpO2   12/13/22 1142 98.4 °F (36.9 °C) 99 16 110/75 98 %   12/13/22 0721 98.1 °F (36.7 °C) 97 16 (!) 88/56 93 %   12/13/22 0319 98.1 °F (36.7 °C) 99 -- 95/60 94 %   12/12/22 2309 98.1 °F (36.7 °C) (!) 112 17 95/62 98 %   12/12/22 2246 -- -- 18 -- --   12/12/22 2216 -- -- 17 -- --   12/12/22 1831 100 °F (37.8 °C) (!) 116 18 115/68 98 %   12/12/22 1652 -- -- 19 -- --   12/12/22 1622 -- -- 18 -- --   12/12/22 1515 100 °F (37.8 °C) (!) 118 18 113/73 100 %       Oxygen Therapy  SpO2: 98 %  O2 Device: None (Room air)    Estimated body mass index is 26.37 kg/m² as calculated from the following:    Height as of this encounter: 5' (1.524 m). Weight as of this encounter: 135 lb (61.2 kg). No intake or output data in the 24 hours ending 12/13/22 1210      Physical Exam:     Blood pressure 110/75, pulse 99, temperature 98.4 °F (36.9 °C), temperature source Oral, resp. rate 16, height 5' (1.524 m), weight 135 lb (61.2 kg), SpO2 98 %. General:    Well nourished. Head:  Normocephalic, atraumatic  Eyes:  Sclerae appear normal.  Pupils equally round. ENT:  Nares appear normal, no drainage. Moist oral mucosa  Neck:  No restricted ROM. Trachea midline   CV:   RRR. No m/r/g. No jugular venous distension. Lungs:   CTAB. No wheezing, rhonchi, or rales. Symmetric expansion. Abdomen: Bowel sounds present. Soft, nontender, nondistended. Bilateral CVA tenderness   Extremities: No cyanosis or clubbing. No edema  Skin:     No rashes and normal coloration. Warm and dry. Neuro:  CN II-XII grossly intact. Sensation intact. A&Ox3  Psych:  Normal mood and affect.       I have personally reviewed labs and tests showing:  Recent Labs:  Recent Results (from the past 48 hour(s))   Rapid influenza A/B antigens    Collection Time: 12/11/22  8:37 PM    Specimen: Nasal Washing   Result Value Ref Range    Influenza A Ag Negative NEG      Influenza B Ag Negative NEG      Source Nasopharyngeal     COVID-19, Rapid Collection Time: 12/11/22  8:37 PM    Specimen: Nasopharyngeal   Result Value Ref Range    Source Nasopharyngeal      SARS-CoV-2, Rapid Not detected NOTD     POC Pregnancy Urine Qual    Collection Time: 12/11/22  8:57 PM   Result Value Ref Range    Preg Test, Ur Negative NEG     Urinalysis w rflx microscopic    Collection Time: 12/11/22  8:58 PM   Result Value Ref Range    Color, UA ORANGE      Appearance CLOUDY      Specific Gravity, UA 1.027 (H) 1.001 - 1.023      pH, Urine 5.0 5.0 - 9.0      Protein,  (A) NEG mg/dL    Glucose, UA >1000 mg/dL    Ketones, Urine Negative NEG mg/dL    Bilirubin Urine SMALL (A) NEG      Blood, Urine TRACE (A) NEG      Urobilinogen, Urine 1.0 0.2 - 1.0 EU/dL    Nitrite, Urine Positive (A) NEG      Leukocyte Esterase, Urine MODERATE (A) NEG      WBC, UA 20-50 0 /hpf    RBC, UA 0-3 0 /hpf    Epithelial Cells UA 5-10 0 /hpf    BACTERIA, URINE 4+ (H) 0 /hpf    Yeast, UA OCCASIONAL     Culture, Urine    Collection Time: 12/11/22  8:58 PM    Specimen: Urine, clean catch   Result Value Ref Range    Special Requests NO SPECIAL REQUESTS      Culture >100,000 COLONIES/mL MIXED SKIN TAZ ISOLATED      Culture        THREE OR MORE TYPES OF ORGANISMS ARE PRESENT. THIS IS INDICATIVE OF CONTAMINATION DUE TO IMPROPER COLLECTION TECHNIQUE. PLEASE REPEAT COLLECTION UNLESS PATIENT HAS STARTED ANTIBIOTIC TREATMENT.    Comprehensive Metabolic Panel    Collection Time: 12/11/22  9:25 PM   Result Value Ref Range    Sodium 127 (L) 133 - 143 mmol/L    Potassium 4.4 3.5 - 5.1 mmol/L    Chloride 96 (L) 101 - 110 mmol/L    CO2 25 21 - 32 mmol/L    Anion Gap 6 2 - 11 mmol/L    Glucose 578 (HH) 65 - 100 mg/dL    BUN 17 6 - 23 MG/DL    Creatinine 1.35 (H) 0.6 - 1.0 MG/DL    Est, Glom Filt Rate 51 (L) >60 ml/min/1.73m2    Calcium 8.8 8.3 - 10.4 MG/DL    Total Bilirubin 0.6 0.2 - 1.1 MG/DL    ALT 33 12 - 65 U/L    AST 43 (H) 15 - 37 U/L    Alk Phosphatase 141 (H) 50 - 136 U/L    Total Protein 7.5 6.3 - 8.2 g/dL    Albumin 2.7 (L) 3.5 - 5.0 g/dL    Globulin 4.8 (H) 2.8 - 4.5 g/dL    Albumin/Globulin Ratio 0.6 0.4 - 1.6     Blood Culture 1    Collection Time: 12/11/22  9:25 PM    Specimen: Blood   Result Value Ref Range    Special Requests NO SPECIAL REQUESTS  LEFT  HAND        Gram stain GRAM NEGATIVE RODS      Gram stain AEROBIC AND ANAEROBIC BOTTLES      Gram stain        RESULTS VERIFIED, PHONED TO AND READ BACK BY  ON 12/12/22 @ 0936 BY SERGIO    Culture (A)       GRAM NEGATIVE RODS IDENTIFICATION AND SUSCEPTIBILITY TO FOLLOW    Culture        Refer to Blood Culture ID Panel Accession [id number]   Blood Culture 2    Collection Time: 12/11/22  9:25 PM    Specimen: Blood   Result Value Ref Range    Special Requests NO SPECIAL REQUESTS  RIGHT  Antecubital        Gram stain GRAM NEGATIVE RODS      Gram stain AEROBIC AND ANAEROBIC BOTTLES      Gram stain        CRITICAL RESULT NOT CALLED DUE TO PREVIOUS NOTIFICATION OF CRITICAL RESULT WITHIN THE LAST 24 HOURS.     Culture (A)       GRAM POSITIVE COCCI For identification and susceptibility refer to culture ACCESSION [id number]    Culture CULTURE IN [facility] UPDATES TO FOLLOW     CBC with Auto Differential    Collection Time: 12/11/22  9:25 PM   Result Value Ref Range    WBC 14.9 (H) 4.3 - 11.1 K/uL    RBC 3.43 (L) 4.05 - 5.2 M/uL    Hemoglobin 9.8 (L) 11.7 - 15.4 g/dL    Hematocrit 31.1 (L) 35.8 - 46.3 %    MCV 90.7 82.0 - 102.0 FL    MCH 28.6 26.1 - 32.9 PG    MCHC 31.5 31.4 - 35.0 g/dL    RDW 13.5 11.9 - 14.6 %    Platelets 023 115 - 140 K/uL    MPV 9.8 9.4 - 12.3 FL    nRBC 0.00 0.0 - 0.2 K/uL    Differential Type AUTOMATED      Seg Neutrophils 87 (H) 43 - 78 %    Lymphocytes 7 (L) 13 - 44 %    Monocytes 5 4.0 - 12.0 %    Eosinophils % 0 (L) 0.5 - 7.8 %    Basophils 0 0.0 - 2.0 %    Immature Granulocytes 1 0.0 - 5.0 %    Segs Absolute 13.0 (H) 1.7 - 8.2 K/UL    Absolute Lymph # 1.0 0.5 - 4.6 K/UL    Absolute Mono # 0.7 0.1 - 1.3 K/UL    Absolute Eos # 0.0 0.0 - 0.8 K/UL    Basophils Absolute 0.1 0.0 - 0.2 K/UL    Absolute Immature Granulocyte 0.1 0.0 - 0.5 K/UL   Lactate, Sepsis    Collection Time: 12/11/22  9:25 PM   Result Value Ref Range    Lactic Acid, Sepsis 1.4 0.4 - 2.0 MMOL/L   Procalcitonin    Collection Time: 12/11/22  9:25 PM   Result Value Ref Range    Procalcitonin 2.63 (H) 0.00 - 0.49 ng/mL   Culture, Blood, PCR ID Panel    Collection Time: 12/11/22  9:25 PM    Specimen: Blood   Result Value Ref Range    Accession Number [id number]     Enterococcus faecalis by PCR NOT DETECTED NOTDET      Enterococcus faecium by PCR NOT DETECTED NOTDET      Listeria monocytogenes by PCR NOT DETECTED NOTDET      STAPHYLOCOCCUS NOT DETECTED NOTDET      Staphylococcus Aureus NOT DETECTED NOTDET      Staphylococcus epidermidis by PCR NOT DETECTED NOTDET      Staphylococcus lugdunensis by PCR NOT DETECTED NOTDET      STREPTOCOCCUS NOT DETECTED NOTDET      Streptococcus agalactiae (Group B) NOT DETECTED NOTDET      Strep pneumoniae NOT DETECTED NOTDET      Strep pyogenes,(Grp. A) NOT DETECTED NOTDET      Acinetobacter calcoac baumannii complex by PCR NOT DETECTED NOTDET      Bacteroides fragilis by PCR NOT DETECTED NOTDET      Enterobacteriaceae by PCR Detected (A) NOTDET      Enterobacter cloacae complex by PCR NOT DETECTED NOTDET      Escherichia Coli Detected (A) NOTDET      Klebsiella aerogenes by PCR NOT DETECTED NOTDET      Klebsiella oxytoca by PCR NOT DETECTED NOTDET      Klebsiella pneumoniae group by PCR NOT DETECTED NOTDET      Proteus by PCR NOT DETECTED NOTDET      Salmonella species by PCR NOT DETECTED NOTDET      Serratia marcescens by PCR NOT DETECTED NOTDET      Haemophilus Influenzae by PCR NOT DETECTED NOTDET      Neisseria meningitidis by PCR NOT DETECTED NOTDET      Pseudomonas aeruginosa NOT DETECTED NOTDET      Stenotrophomonas maltophilia by PCR NOT DETECTED NOTDET      Candida albicans by PCR NOT DETECTED NOTDET      Candida auris by PCR NOT DETECTED NOTDET      Candida glabrata NOT DETECTED NOTDET      Candida krusei by PCR NOT DETECTED NOTDET      Candida parapsilosis by PCR NOT DETECTED NOTDET      Candida tropicalis by PCR NOT DETECTED NOTDET      Cryptococcus neoformans/gattii by PCR NOT DETECTED NOTDET      Resistant gene targets          Resistant gene ctx-m by PCR NOT DETECTED NOTDET      Resistant gene imp by PCR NOT DETECTED NOTDET      KPC (Carbapenem resistance gene) NOT DETECTED NOTDET      Colistin Resistance mcr-1 gene by PCR NOT DETECTED NOTDET      Resistant gene ndm by PCR NOT DETECTED NOTDET      Resistant gene oxa-48-like by pcr NOT DETECTED NOTDET      Resistant gene vim by PCR NOT DETECTED NOTDET      [employer] test comment        False positive results may rarely occur.  Correlate with clinical,epidemiologic, and other laboratory findings   EKG 12 Lead    Collection Time: 12/11/22  9:29 PM   Result Value Ref Range    Ventricular Rate 102 BPM    Atrial Rate 102 BPM    P-R Interval 136 ms    QRS Duration 76 ms    Q-T Interval 324 ms    QTc Calculation (Bazett) 422 ms    P Axis 58 degrees    R Axis 109 degrees    T Axis 34 degrees    Diagnosis !! AGE AND GENDER SPECIFIC ECG ANALYSIS !!    POCT Glucose    Collection Time: 12/11/22 11:34 PM   Result Value Ref Range    POC Glucose 275 (H) 65 - 100 mg/dL    Performed by: Marciano    Lactate, Sepsis    Collection Time: 12/12/22  3:51 AM   Result Value Ref Range    Lactic Acid, Sepsis 0.9 0.4 - 2.0 MMOL/L   POCT Glucose    Collection Time: 12/12/22  6:25 AM   Result Value Ref Range    POC Glucose 209 (H) 65 - 100 mg/dL    Performed by: Daija Hernandez    CBC with Auto Differential    Collection Time: 12/12/22 10:05 AM   Result Value Ref Range    WBC 10.6 4.3 - 11.1 K/uL    RBC 2.91 (L) 4.05 - 5.2 M/uL    Hemoglobin 8.5 (L) 11.7 - 15.4 g/dL    Hematocrit 26.4 (L) 35.8 - 46.3 %    MCV 90.7 82.0 - 102.0 FL    MCH 29.2 26.1 - 32.9 PG    MCHC 32.2 31.4 - 35.0 g/dL    RDW 13.6 11.9 - 14.6 %    Platelets 431 755 - 494 K/uL    MPV 9.6 9.4 - 12.3 FL    nRBC 0.00 0.0 - 0.2 K/uL    Differential Type AUTOMATED      Seg Neutrophils 87 (H) 43 - 78 %    Lymphocytes 8 (L) 13 - 44 %    Monocytes 4 4.0 - 12.0 %    Eosinophils % 0 (L) 0.5 - 7.8 %    Basophils 0 0.0 - 2.0 %    Immature Granulocytes 1 0.0 - 5.0 %    Segs Absolute 9.2 (H) 1.7 - 8.2 K/UL    Absolute Lymph # 0.8 0.5 - 4.6 K/UL    Absolute Mono # 0.4 0.1 - 1.3 K/UL    Absolute Eos # 0.0 0.0 - 0.8 K/UL    Basophils Absolute 0.0 0.0 - 0.2 K/UL    Absolute Immature Granulocyte 0.1 0.0 - 0.5 K/UL   Comprehensive Metabolic Panel w/ Reflex to MG    Collection Time: 12/12/22 10:05 AM   Result Value Ref Range    Sodium 139 133 - 143 mmol/L    Potassium 3.7 3.5 - 5.1 mmol/L    Chloride 110 101 - 110 mmol/L    CO2 25 21 - 32 mmol/L    Anion Gap 4 2 - 11 mmol/L    Glucose 256 (H) 65 - 100 mg/dL    BUN 13 6 - 23 MG/DL    Creatinine 0.76 0.6 - 1.0 MG/DL    Est, Glom Filt Rate >60 >60 ml/min/1.73m2    Calcium 7.8 (L) 8.3 - 10.4 MG/DL    Total Bilirubin 0.4 0.2 - 1.1 MG/DL    ALT 26 12 - 65 U/L    AST 20 15 - 37 U/L    Alk Phosphatase 117 50 - 130 U/L    Total Protein 6.3 6.3 - 8.2 g/dL    Albumin 2.1 (L) 3.5 - 5.0 g/dL    Globulin 4.2 2.8 - 4.5 g/dL    Albumin/Globulin Ratio 0.5 0.4 - 1.6     POCT Glucose    Collection Time: 12/12/22 11:12 AM   Result Value Ref Range    POC Glucose 248 (H) 65 - 100 mg/dL    Performed by: Paul    POCT Glucose    Collection Time: 12/12/22  4:23 PM   Result Value Ref Range    POC Glucose 258 (H) 65 - 100 mg/dL    Performed by: Stefani Villanueva    POCT Glucose    Collection Time: 12/12/22  8:43 PM   Result Value Ref Range    POC Glucose 432 (H) 65 - 100 mg/dL    Performed by: Yuko    CBC with Auto Differential    Collection Time: 12/13/22  4:49 AM   Result Value Ref Range    WBC 8.6 4.3 - 11.1 K/uL    RBC 2.68 (L) 4.05 - 5.2 M/uL    Hemoglobin 7.7 (L) 11.7 - 15.4 g/dL    Hematocrit 24.6 (L) 35.8 - 46.3 %    MCV 91.8 82.0 - 102.0 FL    MCH 28.7 26.1 - 32.9 PG    MCHC 31.3 (L) 31.4 - 35.0 g/dL    RDW 14.0 11.9 - 14.6 %    Platelets 905 128 - 145 K/uL    MPV 9.9 9.4 - 12.3 FL    nRBC 0.00 0.0 - 0.2 K/uL    Differential Type AUTOMATED      Seg Neutrophils 77 43 - 78 %    Lymphocytes 15 13 - 44 %    Monocytes 6 4.0 - 12.0 %    Eosinophils % 1 0.5 - 7.8 %    Basophils 0 0.0 - 2.0 %    Immature Granulocytes 1 0.0 - 5.0 %    Segs Absolute 6.6 1.7 - 8.2 K/UL    Absolute Lymph # 1.3 0.5 - 4.6 K/UL    Absolute Mono # 0.5 0.1 - 1.3 K/UL    Absolute Eos # 0.1 0.0 - 0.8 K/UL    Basophils Absolute 0.0 0.0 - 0.2 K/UL    Absolute Immature Granulocyte 0.1 0.0 - 0.5 K/UL   Comprehensive Metabolic Panel    Collection Time: 12/13/22  4:49 AM   Result Value Ref Range    Sodium 139 133 - 143 mmol/L    Potassium 3.6 3.5 - 5.1 mmol/L    Chloride 109 101 - 110 mmol/L    CO2 24 21 - 32 mmol/L    Anion Gap 6 2 - 11 mmol/L    Glucose 209 (H) 65 - 100 mg/dL    BUN 9 6 - 23 MG/DL    Creatinine 0.61 0.6 - 1.0 MG/DL    Est, Glom Filt Rate >60 >60 ml/min/1.73m2    Calcium 7.4 (L) 8.3 - 10.4 MG/DL    Total Bilirubin 0.1 (L) 0.2 - 1.1 MG/DL    ALT 48 12 - 65 U/L    AST 45 (H) 15 - 37 U/L    Alk Phosphatase 183 (H) 50 - 136 U/L    Total Protein 5.8 (L) 6.3 - 8.2 g/dL    Albumin 1.8 (L) 3.5 - 5.0 g/dL    Globulin 4.0 2.8 - 4.5 g/dL    Albumin/Globulin Ratio 0.5 0.4 - 1.6     POCT Glucose    Collection Time: 12/13/22  6:13 AM   Result Value Ref Range    POC Glucose 192 (H) 65 - 100 mg/dL    Performed by: Yuko    POCT Glucose    Collection Time: 12/13/22 11:40 AM   Result Value Ref Range    POC Glucose 255 (H) 65 - 100 mg/dL    Performed by: Yuli Diaz        I have personally reviewed imaging studies showing: Other Studies:  CT ABDOMEN PELVIS RENAL STONE   Final Result   1. Hazy stranding around the left kidney. Differential considerations include   pyelonephritis and sequela of a recently passed stone. 2. Constipation.          XR CHEST PORTABLE   Final Result   Normal chest x-ray.           Current Meds:  Current Facility-Administered Medications   Medication Dose Route Frequency    atorvastatin (LIPITOR) tablet 20 mg  20 mg Oral Nightly    ferrous sulfate (IRON 325) tablet 325 mg  325 mg Oral Daily    insulin glargine (LANTUS) injection vial 20 Units  20 Units SubCUTAneous QAM AC    sodium chloride flush 0.9 % injection 5-40 mL  5-40 mL IntraVENous 2 times per day    sodium chloride flush 0.9 % injection 5-40 mL  5-40 mL IntraVENous PRN    0.9 % sodium chloride infusion   IntraVENous PRN    enoxaparin (LOVENOX) injection 40 mg  40 mg SubCUTAneous Daily    ondansetron (ZOFRAN-ODT) disintegrating tablet 4 mg  4 mg Oral Q8H PRN    Or    ondansetron (ZOFRAN) injection 4 mg  4 mg IntraVENous Q6H PRN    polyethylene glycol (GLYCOLAX) packet 17 g  17 g Oral Daily PRN    acetaminophen (TYLENOL) tablet 650 mg  650 mg Oral Q6H PRN    Or    acetaminophen (TYLENOL) suppository 650 mg  650 mg Rectal Q6H PRN    0.9 % sodium chloride infusion   IntraVENous Continuous    insulin lispro (HUMALOG) injection vial 0-8 Units  0-8 Units SubCUTAneous TID WC    insulin lispro (HUMALOG) injection vial 0-4 Units  0-4 Units SubCUTAneous Nightly    cefTRIAXone (ROCEPHIN) 2,000 mg in sodium chloride 0.9 % 50 mL IVPB mini-bag  2,000 mg IntraVENous Q24H    lactobacillus acidophilus (FLORANEX) 4 tablet  4 tablet Oral TID    HYDROmorphone HCl PF (DILAUDID) injection 1 mg  1 mg IntraVENous Q4H PRN    HYDROcodone-acetaminophen (NORCO) 5-325 MG per tablet 1 tablet  1 tablet Oral Q6H PRN    benzonatate (TESSALON) capsule 100 mg  100 mg Oral TID PRN       Signed:  STEVE Wilson - JONATAN

## 2022-12-13 NOTE — PROGRESS NOTES
Patient speaks Polish as their preferred language for their healthcare communication. If there are technical problems using the [employer] mobil unit, please contact Language Services for interpretation at:    [employer] -Navigator ([phone])  General phone: [phone] ( [phone])  Email: [email or web address]    Please always document the use of interpreting services (name and/or 's ID number) in your clinical notes. Our interpreters are available for team members working with limited  English proficient (LEP) patients remotely, in person (if needed for special cases), as phone or video interpreters on the [employer] units.         Thank you,        Keli VERA  Senior /Navigator

## 2022-12-13 NOTE — CONSULTS
Infectious Disease Consult  Today's Date: 12/13/2022   Admit Date: 12/11/2022    Impression:   12/11 blood cx with GNR (E. Coli by PCR in all bottles) as well as GPC in one bottle but not id by PCR. I spoke with micro and in review gram stain looks like no GPC and on plate, only GNR is growing. 12/11 urine cx with> 100 K >3  non-pathogenic maria elena on cx    Plan:   Continue current ABX, follow sensi on E. Coli. If S to cipro can be treated with oral regimen x 2 weeks    Anti-infectives:   CTX 12/11-    Subjective:   Date of Consultation:  December 13, 2022  Date of Admission: 12/11/2022   Referring Provider: Bryanna Zavala  Reason for Consult: bacteremia    Patient is a 39 y.o. female with a history of T2DM and HLD  who was admitted on 12/11/2022 with complaints of  malaise and myalgias since 12/9 with nausea/emesis, fever dn dizziness with elevated BS. On arrival in ED, no fever, soft BP, no tachycardia, WBC 14.9,  with depressed Na 127 and normal AG. Mild increase in CRT. Ua with 5-10 epi, 20-50 WBC, +LE and nitrite. CXR negative. CT abd/pelvisrenal protocol with constipation and hazy stranding around the left kidney, no hydronephrosis. No CVA tenderness on exam.   Admitted and put on CTX for possible L pyelonephritis and blood and urine cx obtained. Insulin restarted. Blood cx with 4/4 bottles showing GNR. One bottle was thought to also have GPC. PCR id the GNR as E. Coli. No PCR ID on the [facility] which prompted me to call micro lab. GS reviewed and the blood plates; no GPC seen on either and result updated. Urine grew no p athogens. She had some L flank pain yesterday but gone. Overall feels better. Denies any dysuria. Is sexually active with spouse.      Patient Active Problem List   Diagnosis    Pyelonephritis    DM2 (diabetes mellitus, type 2) ([facility] [address]    Hyperlipidemia, mixed    Hyponatremia    SHERINE (acute kidney injury) ([facility] [address]     Past Medical History:   Diagnosis Date    Diabetes mellitus ([facility] [address]       History reviewed. No pertinent family history. Social History     Tobacco Use    Smoking status: Never    Smokeless tobacco: Never   Substance Use Topics    Alcohol use: Never     History reviewed. No pertinent surgical history. Prior to Admission medications    Medication Sig Start Date End Date Taking? Authorizing Provider   ferrous sulfate (IRON 325) 325 (65 Fe) MG tablet Take 325 mg by mouth daily 22  Yes Historical Provider, MD   insulin glargine (LANTUS SOLOSTAR) 100 UNIT/ML injection pen Inject 20 Units into the skin every morning (before breakfast) 22  Yes Historical Provider, MD   atorvastatin (LIPITOR) 20 MG tablet Take 40 mg by mouth nightly 22   Historical Provider, MD     No Known Allergies       Review of Systems:  10 point Ros negative other than what mentioned in HPI. A comprehensive review of systems was negative. Objective:   Blood pressure 110/75, pulse 99, temperature 98.4 °F (36.9 °C), temperature source Oral, resp. rate 16, height 5' (1.524 m), weight 135 lb (61.2 kg), SpO2 98 %.   Temp (24hrs), Av.8 °F (37.1 °C), Min:98.1 °F (36.7 °C), Max:100 °F (37.8 °C)       Lines:     Exam:     General: NC/AT, alert and cooperative, looks stated age, in NAD   HEENT: PERRL, non-icteric sclera, no splinter hemorrhages   Neck: supple, symmetric, no masses or lymphadenopathy   Cardiac:Nl S1/S2, no murmurs/rubs/gallops/clicks, no LE edema   Pulmonary:clear bilaterally gagandeep/wheezes, good air movement    Abdomen: soft, NT, non-distended, BS normal, no CVA tenderness   Cook : none   Skin:no rashes, lesions or wounds   MSK:no enlarged or swollen joints in limbs or sternoclavicular area   Extremities: no cords/clots/cyanosis, pulses palpable and symmetric    Psychiatric: mood and affect appropriate to situation       Data Review:   Recent Results (from the past 24 hour(s))   POCT Glucose    Collection Time: 22  4:23 PM   Result Value Ref Range    POC Glucose 258 (H) 65 - 100 mg/dL Performed by: Noemy Cobian    POCT Glucose    Collection Time: 12/12/22  8:43 PM   Result Value Ref Range    POC Glucose 432 (H) 65 - 100 mg/dL    Performed by: Yuko    CBC with Auto Differential    Collection Time: 12/13/22  4:49 AM   Result Value Ref Range    WBC 8.6 4.3 - 11.1 K/uL    RBC 2.68 (L) 4.05 - 5.2 M/uL    Hemoglobin 7.7 (L) 11.7 - 15.4 g/dL    Hematocrit 24.6 (L) 35.8 - 46.3 %    MCV 91.8 82.0 - 102.0 FL    MCH 28.7 26.1 - 32.9 PG    MCHC 31.3 (L) 31.4 - 35.0 g/dL    RDW 14.0 11.9 - 14.6 %    Platelets 627 759 - 136 K/uL    MPV 9.9 9.4 - 12.3 FL    nRBC 0.00 0.0 - 0.2 K/uL    Differential Type AUTOMATED      Seg Neutrophils 77 43 - 78 %    Lymphocytes 15 13 - 44 %    Monocytes 6 4.0 - 12.0 %    Eosinophils % 1 0.5 - 7.8 %    Basophils 0 0.0 - 2.0 %    Immature Granulocytes 1 0.0 - 5.0 %    Segs Absolute 6.6 1.7 - 8.2 K/UL    Absolute Lymph # 1.3 0.5 - 4.6 K/UL    Absolute Mono # 0.5 0.1 - 1.3 K/UL    Absolute Eos # 0.1 0.0 - 0.8 K/UL    Basophils Absolute 0.0 0.0 - 0.2 K/UL    Absolute Immature Granulocyte 0.1 0.0 - 0.5 K/UL   Comprehensive Metabolic Panel    Collection Time: 12/13/22  4:49 AM   Result Value Ref Range    Sodium 139 133 - 143 mmol/L    Potassium 3.6 3.5 - 5.1 mmol/L    Chloride 109 101 - 110 mmol/L    CO2 24 21 - 32 mmol/L    Anion Gap 6 2 - 11 mmol/L    Glucose 209 (H) 65 - 100 mg/dL    BUN 9 6 - 23 MG/DL    Creatinine 0.61 0.6 - 1.0 MG/DL    Est, Glom Filt Rate >60 >60 ml/min/1.73m2    Calcium 7.4 (L) 8.3 - 10.4 MG/DL    Total Bilirubin 0.1 (L) 0.2 - 1.1 MG/DL    ALT 48 12 - 65 U/L    AST 45 (H) 15 - 37 U/L    Alk Phosphatase 183 (H) 50 - 136 U/L    Total Protein 5.8 (L) 6.3 - 8.2 g/dL    Albumin 1.8 (L) 3.5 - 5.0 g/dL    Globulin 4.0 2.8 - 4.5 g/dL    Albumin/Globulin Ratio 0.5 0.4 - 1.6     POCT Glucose    Collection Time: 12/13/22  6:13 AM   Result Value Ref Range    POC Glucose 192 (H) 65 - 100 mg/dL    Performed by: Yuko    POCT Glucose    Collection Time: 12/13/22 11:40 AM   Result Value Ref Range    POC Glucose 255 (H) 65 - 100 mg/dL    Performed by: Angie Kelly         Microbiology:  [unfilled]        Studies/Imaging:  personally reviewed      Signed By: Pietro Sánchez MD     December 13, 2022     I

## 2022-12-14 LAB
ANION GAP SERPL CALC-SCNC: 4 MMOL/L (ref 2–11)
BACTERIA SPEC CULT: ABNORMAL
BASOPHILS # BLD: 0 K/UL (ref 0–0.2)
BASOPHILS NFR BLD: 0 % (ref 0–2)
BUN SERPL-MCNC: 6 MG/DL (ref 6–23)
CALCIUM SERPL-MCNC: 8.1 MG/DL (ref 8.3–10.4)
CHLORIDE SERPL-SCNC: 109 MMOL/L (ref 101–110)
CO2 SERPL-SCNC: 26 MMOL/L (ref 21–32)
CREAT SERPL-MCNC: 0.62 MG/DL (ref 0.6–1)
DIFFERENTIAL METHOD BLD: ABNORMAL
EOSINOPHIL # BLD: 0.1 K/UL (ref 0–0.8)
EOSINOPHIL NFR BLD: 1 % (ref 0.5–7.8)
ERYTHROCYTE [DISTWIDTH] IN BLOOD BY AUTOMATED COUNT: 14.2 % (ref 11.9–14.6)
FOLATE SERPL-MCNC: 8.8 NG/ML (ref 3.1–17.5)
GLUCOSE BLD STRIP.AUTO-MCNC: 141 MG/DL (ref 65–100)
GLUCOSE BLD STRIP.AUTO-MCNC: 148 MG/DL (ref 65–100)
GLUCOSE BLD STRIP.AUTO-MCNC: 250 MG/DL (ref 65–100)
GLUCOSE BLD STRIP.AUTO-MCNC: 300 MG/DL (ref 65–100)
GLUCOSE SERPL-MCNC: 181 MG/DL (ref 65–100)
GRAM STN SPEC: ABNORMAL
HCT VFR BLD AUTO: 24.5 % (ref 35.8–46.3)
HGB BLD-MCNC: 7.7 G/DL (ref 11.7–15.4)
IMM GRANULOCYTES # BLD AUTO: 0.1 K/UL (ref 0–0.5)
IMM GRANULOCYTES NFR BLD AUTO: 1 % (ref 0–5)
IRON SATN MFR SERPL: 23 %
IRON SERPL-MCNC: 29 UG/DL (ref 35–150)
LYMPHOCYTES # BLD: 1.5 K/UL (ref 0.5–4.6)
LYMPHOCYTES NFR BLD: 18 % (ref 13–44)
MAGNESIUM SERPL-MCNC: 2 MG/DL (ref 1.8–2.4)
MCH RBC QN AUTO: 28.2 PG (ref 26.1–32.9)
MCHC RBC AUTO-ENTMCNC: 31.4 G/DL (ref 31.4–35)
MCV RBC AUTO: 89.7 FL (ref 82–102)
MONOCYTES # BLD: 0.5 K/UL (ref 0.1–1.3)
MONOCYTES NFR BLD: 6 % (ref 4–12)
NEUTS SEG # BLD: 5.9 K/UL (ref 1.7–8.2)
NEUTS SEG NFR BLD: 73 % (ref 43–78)
NRBC # BLD: 0 K/UL (ref 0–0.2)
PLATELET # BLD AUTO: 315 K/UL (ref 150–450)
PMV BLD AUTO: 9.4 FL (ref 9.4–12.3)
POTASSIUM SERPL-SCNC: 3.2 MMOL/L (ref 3.5–5.1)
RBC # BLD AUTO: 2.73 M/UL (ref 4.05–5.2)
SERVICE CMNT-IMP: ABNORMAL
SODIUM SERPL-SCNC: 139 MMOL/L (ref 133–143)
TIBC SERPL-MCNC: 128 UG/DL (ref 250–450)
VIT B12 SERPL-MCNC: 1477 PG/ML (ref 193–986)
WBC # BLD AUTO: 8.1 K/UL (ref 4.3–11.1)

## 2022-12-14 PROCEDURE — 6360000002 HC RX W HCPCS: Performed by: FAMILY MEDICINE

## 2022-12-14 PROCEDURE — 36415 COLL VENOUS BLD VENIPUNCTURE: CPT

## 2022-12-14 PROCEDURE — 2580000003 HC RX 258: Performed by: FAMILY MEDICINE

## 2022-12-14 PROCEDURE — 6370000000 HC RX 637 (ALT 250 FOR IP): Performed by: FAMILY MEDICINE

## 2022-12-14 PROCEDURE — 85025 COMPLETE CBC W/AUTO DIFF WBC: CPT

## 2022-12-14 PROCEDURE — 6360000002 HC RX W HCPCS: Performed by: NURSE PRACTITIONER

## 2022-12-14 PROCEDURE — 82962 GLUCOSE BLOOD TEST: CPT

## 2022-12-14 PROCEDURE — 1100000000 HC RM PRIVATE

## 2022-12-14 PROCEDURE — 80048 BASIC METABOLIC PNL TOTAL CA: CPT

## 2022-12-14 PROCEDURE — 82746 ASSAY OF FOLIC ACID SERUM: CPT

## 2022-12-14 PROCEDURE — 2580000003 HC RX 258: Performed by: NURSE PRACTITIONER

## 2022-12-14 PROCEDURE — 83735 ASSAY OF MAGNESIUM: CPT

## 2022-12-14 PROCEDURE — 6370000000 HC RX 637 (ALT 250 FOR IP): Performed by: NURSE PRACTITIONER

## 2022-12-14 PROCEDURE — 82607 VITAMIN B-12: CPT

## 2022-12-14 PROCEDURE — 83540 ASSAY OF IRON: CPT

## 2022-12-14 RX ORDER — INSULIN LISPRO 100 [IU]/ML
0-4 INJECTION, SOLUTION INTRAVENOUS; SUBCUTANEOUS NIGHTLY
Status: DISCONTINUED | OUTPATIENT
Start: 2022-12-14 | End: 2022-12-15 | Stop reason: HOSPADM

## 2022-12-14 RX ORDER — POTASSIUM CHLORIDE 20 MEQ/1
40 TABLET, EXTENDED RELEASE ORAL 2 TIMES DAILY WITH MEALS
Status: COMPLETED | OUTPATIENT
Start: 2022-12-14 | End: 2022-12-14

## 2022-12-14 RX ORDER — INSULIN LISPRO 100 [IU]/ML
0-4 INJECTION, SOLUTION INTRAVENOUS; SUBCUTANEOUS
Status: DISCONTINUED | OUTPATIENT
Start: 2022-12-14 | End: 2022-12-15 | Stop reason: HOSPADM

## 2022-12-14 RX ORDER — INSULIN LISPRO 100 [IU]/ML
2 INJECTION, SOLUTION INTRAVENOUS; SUBCUTANEOUS
Status: DISCONTINUED | OUTPATIENT
Start: 2022-12-15 | End: 2022-12-15 | Stop reason: HOSPADM

## 2022-12-14 RX ADMIN — SODIUM CHLORIDE, PRESERVATIVE FREE 10 ML: 5 INJECTION INTRAVENOUS at 20:19

## 2022-12-14 RX ADMIN — CEFTRIAXONE 2000 MG: 2 INJECTION, POWDER, FOR SOLUTION INTRAMUSCULAR; INTRAVENOUS at 20:12

## 2022-12-14 RX ADMIN — LACTOBACILLUS TAB 4 TABLET: TAB at 12:20

## 2022-12-14 RX ADMIN — LACTOBACILLUS TAB 4 TABLET: TAB at 09:42

## 2022-12-14 RX ADMIN — FERROUS SULFATE TAB 325 MG (65 MG ELEMENTAL FE) 325 MG: 325 (65 FE) TAB at 09:42

## 2022-12-14 RX ADMIN — ENOXAPARIN SODIUM 40 MG: 100 INJECTION SUBCUTANEOUS at 09:44

## 2022-12-14 RX ADMIN — POTASSIUM CHLORIDE 40 MEQ: 1500 TABLET, EXTENDED RELEASE ORAL at 17:29

## 2022-12-14 RX ADMIN — INSULIN LISPRO 4 UNITS: 100 INJECTION, SOLUTION INTRAVENOUS; SUBCUTANEOUS at 12:21

## 2022-12-14 RX ADMIN — SODIUM CHLORIDE, PRESERVATIVE FREE 10 ML: 5 INJECTION INTRAVENOUS at 09:47

## 2022-12-14 RX ADMIN — INSULIN GLARGINE 24 UNITS: 100 INJECTION, SOLUTION SUBCUTANEOUS at 09:40

## 2022-12-14 RX ADMIN — INSULIN LISPRO 4 UNITS: 100 INJECTION, SOLUTION INTRAVENOUS; SUBCUTANEOUS at 21:23

## 2022-12-14 RX ADMIN — HYDROCODONE BITARTRATE AND ACETAMINOPHEN 1 TABLET: 5; 325 TABLET ORAL at 20:12

## 2022-12-14 RX ADMIN — POTASSIUM CHLORIDE 40 MEQ: 1500 TABLET, EXTENDED RELEASE ORAL at 09:41

## 2022-12-14 RX ADMIN — ATORVASTATIN CALCIUM 20 MG: 40 TABLET, FILM COATED ORAL at 20:11

## 2022-12-14 RX ADMIN — LACTOBACILLUS TAB 4 TABLET: TAB at 20:11

## 2022-12-14 ASSESSMENT — PAIN DESCRIPTION - DESCRIPTORS: DESCRIPTORS: ACHING

## 2022-12-14 ASSESSMENT — PAIN SCALES - GENERAL
PAINLEVEL_OUTOF10: 6
PAINLEVEL_OUTOF10: 0

## 2022-12-14 ASSESSMENT — PAIN DESCRIPTION - LOCATION: LOCATION: ABDOMEN;HEAD

## 2022-12-14 ASSESSMENT — PAIN DESCRIPTION - ONSET: ONSET: PROGRESSIVE

## 2022-12-14 ASSESSMENT — PAIN DESCRIPTION - FREQUENCY: FREQUENCY: INTERMITTENT

## 2022-12-14 ASSESSMENT — PAIN DESCRIPTION - PAIN TYPE: TYPE: ACUTE PAIN

## 2022-12-14 NOTE — DIABETES MGMT
Patient admitted with pyelonephritis. Admitting blood glucose 578. HbA1c 10.5 (). Noted A1c improved from 12.9% in October 2022. Blood glucose ranged 192-315 yesterday with patient receiving Lantus 24 units and Humalog 14 units. Blood glucose this morning was 148. Most recent FSBS 250. Patient would likely benefit from prandial insulin to help offset hyperglycemia during the day related to caloric intake. Provider updated via [facility] regarding recommendations and patient glycemic control. Creatinine 0.62. GFR >60. Reviewed patient current regimen: Lantus 24 units daily and Humalog correctional insulin. Patient seen for assessment regarding diabetes management. Patient speaks [place].  Harlan Dsouza ([id number]) utilized via [employer]. Patient speaks some English but multiple times needed clarification from  during conversation. Patient has a past medical history of type 2 diabetes, hyperlipidemia. Patient states they have been living with diabetes for 7 years and voices a positive family history of diabetes. Patient states they do have a working glucometer with supplies at home. Per patient they typically check blood glucose levels 1 times a day in the evening. Per patient their blood glucose levels have been running 250, 260, 280 at home. Patient states they are currently taking long acting insulin 20 units at home and Metformin 1000mg BID at home for management of diabetes. Patient voices that they have not experienced hypoglycemia in the past. Educated regarding hypoglycemia signs, symptoms, and treatment. Patient has not attended formal diabetes education in the past but states a diabetes educator worked with her at [facility] in October. Patient states through her PCP pharmacy through [facility] she is getting long acting insulin pens for $70 for 3 month supply.  Patient reports no current difficulty with affording their diabetic supplies but states she has been trying to find where to get the cheapest diabetes supplies. Discussed [facility] and [employer]. Provided phone number for [employer] program.     Patient given educational material, \"Diabetes Self-Management: A Patient Teaching Guide\", which was reviewed with patient. Explained basic physiology of diabetes, as well as causes, signs and symptoms, and treatments for hypoglycemia and hyperglycemia. Described the effects of poor glycemic control and the development of long-term complications such as renal, eye, nerve, and cardiovascular disease. Patient denies smoking. Per patient they typically drink coffee with 1% milk, juice with water, water. Reviewed alternative beverages to help improve glycemic control. Patient voices that they do drink alcoholic beverages sometimes (3 ultra beers at a time). Reviewed ADA recommendations regarding alcohol intake and diabetes. Per patient they typically eat 3 meals (breakfast- low carb toast with jelly or eggs; lunch- no more rice, eats chicken with fruit; dinner-soup or leftover chicken or fish. Encouraged increase in non-starchy vegetables as patient mentions [address] does not include a lot of vegetables. Educated re: effects of carbohydrates on blood glucose, the \"plate method\" of healthy meal planning, basics of healthy meal plan, Consistent Carbohydrate Diet, discussed the basics of carb counting and how to read a nutrition label. Educated patient regarding the benefits of physical activity (as cleared by provider) on glycemic control. Also explained the relationship between hyperglycemia and infection and delayed healing. Discussed target goals for blood glucose and A1C. Educated patient regarding diabetic medications including mechanism of action, timing, and possible side effects. Patient verbalizes understanding of teaching.     Explained the importance of blood glucose monitoring to patient and how this will provide their primary care provider with more information to help them safely titrate their regimen. Recommended frequency of TID and to record in log book to take to primary care provider appointment. Patient is self pay. Patient given glucometer kit with 40 test strips and 110 lancets as patient is self pay. Patient educated regarding insulin administration sites. Patient states she is using her stomach and that she started on insulin about a month ago. Patient verbalized site rotation. Reviewed proper storage of insulin, and proper sharps disposal.     Educated patient regarding current basal/bolus regimen of Lantus 24 units and Humalog correctional insulin including type of insulin, timing with meals, onset, duration of effect, and peak of insulin dose. Patient verbalizes understanding. Patient educated on the different types of insulins including Regular, NPH, and mixed insulins. Discussed increased risk of hypoglycemia when using NPH or mixed insulins versus Lantus, the importance of eating regularly when taking those types of insulin, and reviewed s/s treatments of hypoglycemia again. Patient verbalized understanding. Patient has been given educational handouts regarding insulin affordability programs, [employer] insulins, and over-the-counter glucometers and diabetes supplies as more affordable options which were reviewed with patient. Patient educated on the different types of insulins including but not limited to Lantus, Basaglar, Humalog, Regular, NPH, and mixed insulins. Discussed increased risk of hypoglycemia when using NPH or mixed insulins versus Lantus, the importance of eating regularly when taking those types of insulin, and reviewed s/s treatments of hypoglycemia again. Patient states she is unable to qualify for coupon card. Discussed over-the-counter insulins. Patient states they can afford ~$42 for 5 Relion insulin Novolin R pens if needed at discharge for fast acting insulin coverage.  Patient states she has the purple and gray insulin pens at home (likely Lantus given color recognition and prescriptions in care-everywhere). Patient provided 25 pen needles and reviewed where patient can purchase over-the-counter pen needles. Encouraged compliance with discharge regimen. Encouraged patient to continue to work on lifestyle modifications and to follow up with PCP for further titration of regimen. Patient verbalized understanding and voices no further questions regarding diabetes management. Spoke with provider new orders received to start Humalog 2 units with meals and change correctional insulin from medium dose to low dose.

## 2022-12-14 NOTE — PROGRESS NOTES
Hospitalist Progress Note   Admit Date:  2022  8:48 PM   Name:  Zeyad Davila   Age:  39 y.o. Sex:  female  :  1980   MRN:  [id number]   Room:  [facility]/    Presenting Complaint: Cough, Dizziness, Emesis, and Generalized Body Aches     Reason(s) for Admission: Pyelonephritis [N12]  Acute pyelonephritis [N10]  Hyperglycemia [R73.9]  SHERINE (acute kidney injury) ([facility] [address] [N17.9]  Sepsis, due to unspecified organism, unspecified whether acute organ dysfunction present [facility]) [A41.9]     Hospital Course:   Ms. Eve Beaver is a 38 yo female with PMH of DM 2, HLD, who presented with c/o generalized malaise and myalgias. C/o n/v, fevers, dizziness. BGL elevated. Found to have leukocytosis, hypernatremia (however normal for corrected glucose), . Creatinine 1.35 no baseline, UA +infection. CT abd showed concern for pyelonephritis. Rocephin started. BC + e coli. ID consulted. Subjective & 24hr Events (22): A&O x3, afebrile. ID following. Assessment & Plan:     Principal Problem:  E coli  Pyelonephritis/bacteremia  On IV rocephin   blood cx with GNR (E. Coli by PCR in all bottles) as well as GPC in one bottle but not id by PCR. I spoke with micro and in review gram stain looks like no GPC and on plate, only GNR is growing.  urine cx with > 100 K colonies of  >3  non-pathogenic maria elena on cx    Per ID note:  Continue current ABX as is clinically improved  Follow sensi on E. Coli. If S to cipro can be changed to that to complete a total of 2 weeks ABX for pyelonephritis. EOT 22 assuming E coli is S to CTX. ID following peripherally, need results sensitivity for E. Coli before can make any further recommendations    Active Problems:    DM2 (diabetes mellitus, type 2) ([facility] [address]  Blood sugars elevated;  A1C 10.5  Home lantus increase to 24 units daily with additional 4 units today  SSI  12/14  Improvement with BGL  Continue current regimen for now.   Will consult diabetes management    Hypokalemia:  3.2  Oral replacement  Trend  Mag normal at 2      Hyperlipidemia, mixed  Home statin      Hyponatremia  Resolved        SHERINE (acute kidney injury) ([facility] [address]  Resolved    Anemia:  7.7  No active bleeding  Check anemia labs  Trend and transfuse <7      Anticipated discharge needs:    none    Diet:  ADULT DIET; Regular; 4 carb choices (60 gm/meal)    Code status: Full Code    Hospital Problems:  Principal Problem:    Pyelonephritis  Active Problems:    DM2 (diabetes mellitus, type 2) ([facility] [address]    Hyperlipidemia, mixed    Hyponatremia    SHERINE ([facility]) [facility]  Resolved Problems:    * No resolved hospital problems. *      Objective:   Patient Vitals for the past 24 hrs:   Temp Pulse Resp BP SpO2   12/14/22 1059 99.4 °F (37.4 °C) (!) 101 18 114/78 100 %   12/14/22 0702 99.9 °F (37.7 °C) (!) 105 18 118/73 95 %   12/14/22 0302 98.6 °F (37 °C) 96 -- 102/60 96 %   12/13/22 2240 98.6 °F (37 °C) 92 18 (!) 102/57 99 %   12/13/22 1821 -- -- 19 -- --   12/13/22 1751 -- -- 18 -- --   12/13/22 1535 99.1 °F (37.3 °C) (!) 102 16 (!) 125/59 98 %       Oxygen Therapy  SpO2: 100 %  O2 Device: None (Room air)    Estimated body mass index is 26.37 kg/m² as calculated from the following:    Height as of this encounter: 5' (1.524 m). Weight as of this encounter: 135 lb (61.2 kg). No intake or output data in the 24 hours ending 12/14/22 1235      Physical Exam:     Blood pressure 114/78, pulse (!) 101, temperature 99.4 °F (37.4 °C), temperature source Oral, resp. rate 18, height 5' (1.524 m), weight 135 lb (61.2 kg), SpO2 100 %. General:    Well nourished. Head:  Normocephalic, atraumatic  Eyes:  Sclerae appear normal.  Pupils equally round. ENT:  Nares appear normal, no drainage. Moist oral mucosa  Neck:  No restricted ROM. Trachea midline   CV:   RRR. No m/r/g. No jugular venous distension. Lungs:   CTAB. No wheezing, rhonchi, or rales. Symmetric expansion. Abdomen: Bowel sounds present. Soft, nontender, nondistended. Bilateral CVA tenderness   Extremities: No cyanosis or clubbing. No edema  Skin:     No rashes and normal coloration. Warm and dry. Neuro:  CN II-XII grossly intact. Sensation intact. A&Ox3  Psych:  Normal mood and affect.       I have personally reviewed labs and tests showing:  Recent Labs:  Recent Results (from the past 48 hour(s))   POCT Glucose    Collection Time: 12/12/22  4:23 PM   Result Value Ref Range    POC Glucose 258 (H) 65 - 100 mg/dL    Performed by: Wendy Ann    POCT Glucose    Collection Time: 12/12/22  8:43 PM   Result Value Ref Range    POC Glucose 432 (H) 65 - 100 mg/dL    Performed by: Yuko    CBC with Auto Differential    Collection Time: 12/13/22  4:49 AM   Result Value Ref Range    WBC 8.6 4.3 - 11.1 K/uL    RBC 2.68 (L) 4.05 - 5.2 M/uL    Hemoglobin 7.7 (L) 11.7 - 15.4 g/dL    Hematocrit 24.6 (L) 35.8 - 46.3 %    MCV 91.8 82.0 - 102.0 FL    MCH 28.7 26.1 - 32.9 PG    MCHC 31.3 (L) 31.4 - 35.0 g/dL    RDW 14.0 11.9 - 14.6 %    Platelets 899 106 - 661 K/uL    MPV 9.9 9.4 - 12.3 FL    nRBC 0.00 0.0 - 0.2 K/uL    Differential Type AUTOMATED      Seg Neutrophils 77 43 - 78 %    Lymphocytes 15 13 - 44 %    Monocytes 6 4.0 - 12.0 %    Eosinophils % 1 0.5 - 7.8 %    Basophils 0 0.0 - 2.0 %    Immature Granulocytes 1 0.0 - 5.0 %    Segs Absolute 6.6 1.7 - 8.2 K/UL    Absolute Lymph # 1.3 0.5 - 4.6 K/UL    Absolute Mono # 0.5 0.1 - 1.3 K/UL    Absolute Eos # 0.1 0.0 - 0.8 K/UL    Basophils Absolute 0.0 0.0 - 0.2 K/UL    Absolute Immature Granulocyte 0.1 0.0 - 0.5 K/UL   Comprehensive Metabolic Panel    Collection Time: 12/13/22  4:49 AM   Result Value Ref Range    Sodium 139 133 - 143 mmol/L    Potassium 3.6 3.5 - 5.1 mmol/L    Chloride 109 101 - 110 mmol/L    CO2 24 21 - 32 mmol/L    Anion Gap 6 2 - 11 mmol/L    Glucose 209 (H) 65 - 100 mg/dL    BUN 9 6 - 23 MG/DL    Creatinine 0.61 0.6 - 1.0 MG/DL    Est, Glom Filt Rate >60 >60 ml/min/1.73m2 Calcium 7.4 (L) 8.3 - 10.4 MG/DL    Total Bilirubin 0.1 (L) 0.2 - 1.1 MG/DL    ALT 48 12 - 65 U/L    AST 45 (H) 15 - 37 U/L    Alk Phosphatase 183 (H) 50 - 136 U/L    Total Protein 5.8 (L) 6.3 - 8.2 g/dL    Albumin 1.8 (L) 3.5 - 5.0 g/dL    Globulin 4.0 2.8 - 4.5 g/dL    Albumin/Globulin Ratio 0.5 0.4 - 1.6     Hemoglobin A1C    Collection Time: 12/13/22  4:49 AM   Result Value Ref Range    Hemoglobin A1C 10.5 (H) 4.8 - 5.6 %    eAG 255 mg/dL   POCT Glucose    Collection Time: 12/13/22  6:13 AM   Result Value Ref Range    POC Glucose 192 (H) 65 - 100 mg/dL    Performed by: Yuko    POCT Glucose    Collection Time: 12/13/22 11:40 AM   Result Value Ref Range    POC Glucose 255 (H) 65 - 100 mg/dL    Performed by: Melene Goltz    POCT Glucose    Collection Time: 12/13/22  4:28 PM   Result Value Ref Range    POC Glucose 315 (H) 65 - 100 mg/dL    Performed by: Melene Goltz    POCT Glucose    Collection Time: 12/13/22 10:02 PM   Result Value Ref Range    POC Glucose 310 (H) 65 - 100 mg/dL    Performed by: Nishant    CBC with Auto Differential    Collection Time: 12/14/22  3:59 AM   Result Value Ref Range    WBC 8.1 4.3 - 11.1 K/uL    RBC 2.73 (L) 4.05 - 5.2 M/uL    Hemoglobin 7.7 (L) 11.7 - 15.4 g/dL    Hematocrit 24.5 (L) 35.8 - 46.3 %    MCV 89.7 82.0 - 102.0 FL    MCH 28.2 26.1 - 32.9 PG    MCHC 31.4 31.4 - 35.0 g/dL    RDW 14.2 11.9 - 14.6 %    Platelets 152 217 - 697 K/uL    MPV 9.4 9.4 - 12.3 FL    nRBC 0.00 0.0 - 0.2 K/uL    Differential Type AUTOMATED      Seg Neutrophils 73 43 - 78 %    Lymphocytes 18 13 - 44 %    Monocytes 6 4.0 - 12.0 %    Eosinophils % 1 0.5 - 7.8 %    Basophils 0 0.0 - 2.0 %    Immature Granulocytes 1 0.0 - 5.0 %    Segs Absolute 5.9 1.7 - 8.2 K/UL    Absolute Lymph # 1.5 0.5 - 4.6 K/UL    Absolute Mono # 0.5 0.1 - 1.3 K/UL    Absolute Eos # 0.1 0.0 - 0.8 K/UL    Basophils Absolute 0.0 0.0 - 0.2 K/UL    Absolute Immature Granulocyte 0.1 0.0 - 0.5 K/UL   Basic Metabolic Panel w/ Reflex to MG    Collection Time: 12/14/22  3:59 AM   Result Value Ref Range    Sodium 139 133 - 143 mmol/L    Potassium 3.2 (L) 3.5 - 5.1 mmol/L    Chloride 109 101 - 110 mmol/L    CO2 26 21 - 32 mmol/L    Anion Gap 4 2 - 11 mmol/L    Glucose 181 (H) 65 - 100 mg/dL    BUN 6 6 - 23 MG/DL    Creatinine 0.62 0.6 - 1.0 MG/DL    Est, Glom Filt Rate >60 >60 ml/min/1.73m2    Calcium 8.1 (L) 8.3 - 10.4 MG/DL   Magnesium    Collection Time: 12/14/22  3:59 AM   Result Value Ref Range    Magnesium 2.0 1.8 - 2.4 mg/dL   POCT Glucose    Collection Time: 12/14/22  6:23 AM   Result Value Ref Range    POC Glucose 148 (H) 65 - 100 mg/dL    Performed by: Yuko    POCT Glucose    Collection Time: 12/14/22 11:03 AM   Result Value Ref Range    POC Glucose 250 (H) 65 - 100 mg/dL    Performed by: Luisa Chamberlain        I have personally reviewed imaging studies showing: Other Studies:  CT ABDOMEN PELVIS RENAL STONE   Final Result   1. Hazy stranding around the left kidney. Differential considerations include   pyelonephritis and sequela of a recently passed stone. 2. Constipation. XR CHEST PORTABLE   Final Result   Normal chest x-ray.           Current Meds:  Current Facility-Administered Medications   Medication Dose Route Frequency    potassium chloride (KLOR-CON M) extended release tablet 40 mEq  40 mEq Oral BID WC    insulin glargine (LANTUS) injection vial 24 Units  24 Units SubCUTAneous QAM AC    glucose chewable tablet 16 g  4 tablet Oral PRN    dextrose bolus 10% 125 mL  125 mL IntraVENous PRN    Or    dextrose bolus 10% 250 mL  250 mL IntraVENous PRN    glucagon (rDNA) injection 1 mg  1 mg SubCUTAneous PRN    dextrose 10 % infusion   IntraVENous Continuous PRN    atorvastatin (LIPITOR) tablet 20 mg  20 mg Oral Nightly    ferrous sulfate (IRON 325) tablet 325 mg  325 mg Oral Daily    sodium chloride flush 0.9 % injection 5-40 mL  5-40 mL IntraVENous 2 times per day    sodium chloride flush 0.9 % injection 5-40 mL  5-40 mL IntraVENous PRN    0.9 % sodium chloride infusion   IntraVENous PRN    enoxaparin (LOVENOX) injection 40 mg  40 mg SubCUTAneous Daily    ondansetron (ZOFRAN-ODT) disintegrating tablet 4 mg  4 mg Oral Q8H PRN    Or    ondansetron (ZOFRAN) injection 4 mg  4 mg IntraVENous Q6H PRN    polyethylene glycol (GLYCOLAX) packet 17 g  17 g Oral Daily PRN    acetaminophen (TYLENOL) tablet 650 mg  650 mg Oral Q6H PRN    Or    acetaminophen (TYLENOL) suppository 650 mg  650 mg Rectal Q6H PRN    [Held by provider] 0.9 % sodium chloride infusion   IntraVENous Continuous    insulin lispro (HUMALOG) injection vial 0-8 Units  0-8 Units SubCUTAneous TID WC    insulin lispro (HUMALOG) injection vial 0-4 Units  0-4 Units SubCUTAneous Nightly    cefTRIAXone (ROCEPHIN) 2,000 mg in sodium chloride 0.9 % 50 mL IVPB mini-bag  2,000 mg IntraVENous Q24H    lactobacillus acidophilus (FLORANEX) 4 tablet  4 tablet Oral TID    HYDROmorphone HCl PF (DILAUDID) injection 1 mg  1 mg IntraVENous Q4H PRN    HYDROcodone-acetaminophen (NORCO) 5-325 MG per tablet 1 tablet  1 tablet Oral Q6H PRN    benzonatate (TESSALON) capsule 100 mg  100 mg Oral TID PRN       Signed:  Rony Edward, APRN - CNP

## 2022-12-14 NOTE — CARE COORDINATION
Patient care plan reviewed in interdisciplinary rounds with the following disciplines: MD, nursing, therapy, case management, and nutrition services. ID is following and awaiting cultures. Depending on treatment plan and discharge medications, pt may need assistance with prescriptions. She goes to an [facility] based clinic for primary care. Continue to follow.

## 2022-12-14 NOTE — PROGRESS NOTES
Infectious Disease Progress Note  Today's Date: 12/13/2022   Admit Date: 12/11/2022     Impression:   12/11 blood cx with GNR (E. Coli by PCR in all bottles) as well as GPC in one bottle but not id by PCR. I spoke with micro and in review gram stain looks like no GPC and on plate, only GNR is growing. 12/11 urine cx with > 100 K colonies of  >3  non-pathogenic maria elena on cx     Plan:   Continue current ABX as is clinically improved  Follow sensi on E. Coli. If S to cipro can be changed to that to complete a total of 2 weeks ABX for pyelonephritis. EOT 12/25/22 assuming E coli is S to CTX. ID following peripherally, need results sensitivity for E.  Coli before can make any further recommendations     Anti-infectives:   CTX 12/11-     Subjective:   Afebrile, WBC normal CRT normal.

## 2022-12-14 NOTE — PROGRESS NOTES
Patient speaks Azeri as their preferred language for their healthcare communication. If there are technical problems using the AMN mobil unit, please contact Language Services for interpretation at:    Senior Alfonso -Navigator ([phone])  General phone: [phone] ( [phone])  Email: [email or web address]    Please always document the use of interpreting services (name and/or 's ID number) in your clinical notes. Our interpreters are available for team members working with limited  English proficient (LEP) patients remotely, in person (if needed for special cases), as phone or video interpreters on the AM[employer] Mobil units.         Thank you,        Kandi VERA  Senior /Navigator

## 2022-12-15 VITALS
HEART RATE: 96 BPM | OXYGEN SATURATION: 99 % | BODY MASS INDEX: 26.5 KG/M2 | SYSTOLIC BLOOD PRESSURE: 111 MMHG | DIASTOLIC BLOOD PRESSURE: 74 MMHG | WEIGHT: 135 LBS | RESPIRATION RATE: 20 BRPM | TEMPERATURE: 98 F | HEIGHT: 60 IN

## 2022-12-15 PROBLEM — N17.9 AKI (ACUTE KIDNEY INJURY) (HCC): Status: RESOLVED | Noted: 2022-12-12 | Resolved: 2022-12-15

## 2022-12-15 PROBLEM — E87.1 HYPONATREMIA: Status: RESOLVED | Noted: 2022-12-12 | Resolved: 2022-12-15

## 2022-12-15 LAB
ANION GAP SERPL CALC-SCNC: 4 MMOL/L (ref 2–11)
BASOPHILS # BLD: 0 K/UL (ref 0–0.2)
BASOPHILS NFR BLD: 0 % (ref 0–2)
BUN SERPL-MCNC: 9 MG/DL (ref 6–23)
CALCIUM SERPL-MCNC: 8.6 MG/DL (ref 8.3–10.4)
CHLORIDE SERPL-SCNC: 108 MMOL/L (ref 101–110)
CO2 SERPL-SCNC: 27 MMOL/L (ref 21–32)
CREAT SERPL-MCNC: 0.71 MG/DL (ref 0.6–1)
DIFFERENTIAL METHOD BLD: ABNORMAL
EOSINOPHIL # BLD: 0.1 K/UL (ref 0–0.8)
EOSINOPHIL NFR BLD: 1 % (ref 0.5–7.8)
ERYTHROCYTE [DISTWIDTH] IN BLOOD BY AUTOMATED COUNT: 14.4 % (ref 11.9–14.6)
GLUCOSE BLD STRIP.AUTO-MCNC: 130 MG/DL (ref 65–100)
GLUCOSE BLD STRIP.AUTO-MCNC: 142 MG/DL (ref 65–100)
GLUCOSE BLD STRIP.AUTO-MCNC: 167 MG/DL (ref 65–100)
GLUCOSE SERPL-MCNC: 207 MG/DL (ref 65–100)
HCT VFR BLD AUTO: 26.1 % (ref 35.8–46.3)
HGB BLD-MCNC: 8.3 G/DL (ref 11.7–15.4)
IMM GRANULOCYTES # BLD AUTO: 0.1 K/UL (ref 0–0.5)
IMM GRANULOCYTES NFR BLD AUTO: 2 % (ref 0–5)
LYMPHOCYTES # BLD: 1.4 K/UL (ref 0.5–4.6)
LYMPHOCYTES NFR BLD: 19 % (ref 13–44)
MCH RBC QN AUTO: 28.5 PG (ref 26.1–32.9)
MCHC RBC AUTO-ENTMCNC: 31.8 G/DL (ref 31.4–35)
MCV RBC AUTO: 89.7 FL (ref 82–102)
MONOCYTES # BLD: 0.5 K/UL (ref 0.1–1.3)
MONOCYTES NFR BLD: 6 % (ref 4–12)
NEUTS SEG # BLD: 5.3 K/UL (ref 1.7–8.2)
NEUTS SEG NFR BLD: 72 % (ref 43–78)
NRBC # BLD: 0 K/UL (ref 0–0.2)
PLATELET # BLD AUTO: 390 K/UL (ref 150–450)
PMV BLD AUTO: 9.3 FL (ref 9.4–12.3)
POTASSIUM SERPL-SCNC: 3.9 MMOL/L (ref 3.5–5.1)
RBC # BLD AUTO: 2.91 M/UL (ref 4.05–5.2)
SERVICE CMNT-IMP: ABNORMAL
SODIUM SERPL-SCNC: 139 MMOL/L (ref 133–143)
TRANSFERRIN SERPL-MCNC: 88 MG/DL (ref 202–364)
WBC # BLD AUTO: 7.4 K/UL (ref 4.3–11.1)

## 2022-12-15 PROCEDURE — 82962 GLUCOSE BLOOD TEST: CPT

## 2022-12-15 PROCEDURE — 6370000000 HC RX 637 (ALT 250 FOR IP): Performed by: NURSE PRACTITIONER

## 2022-12-15 PROCEDURE — 80048 BASIC METABOLIC PNL TOTAL CA: CPT

## 2022-12-15 PROCEDURE — 6370000000 HC RX 637 (ALT 250 FOR IP): Performed by: INTERNAL MEDICINE

## 2022-12-15 PROCEDURE — 6360000002 HC RX W HCPCS: Performed by: FAMILY MEDICINE

## 2022-12-15 PROCEDURE — 85025 COMPLETE CBC W/AUTO DIFF WBC: CPT

## 2022-12-15 PROCEDURE — 36415 COLL VENOUS BLD VENIPUNCTURE: CPT

## 2022-12-15 PROCEDURE — 2580000003 HC RX 258: Performed by: FAMILY MEDICINE

## 2022-12-15 PROCEDURE — 6370000000 HC RX 637 (ALT 250 FOR IP): Performed by: FAMILY MEDICINE

## 2022-12-15 RX ORDER — CIPROFLOXACIN 750 MG/1
750 TABLET, FILM COATED ORAL EVERY 12 HOURS SCHEDULED
Qty: 21 TABLET | Refills: 0 | Status: SHIPPED | OUTPATIENT
Start: 2022-12-15 | End: 2022-12-26

## 2022-12-15 RX ORDER — L. ACIDOPHILUS/L.BULGARICUS 1MM CELL
4 TABLET ORAL 3 TIMES DAILY
Qty: 360 TABLET | Refills: 0 | Status: SHIPPED | OUTPATIENT
Start: 2022-12-15 | End: 2022-12-15 | Stop reason: SDUPTHER

## 2022-12-15 RX ORDER — L. ACIDOPHILUS/L.BULGARICUS 1MM CELL
4 TABLET ORAL 3 TIMES DAILY
Qty: 360 TABLET | Refills: 0 | Status: SHIPPED | OUTPATIENT
Start: 2022-12-15 | End: 2023-01-14

## 2022-12-15 RX ORDER — CIPROFLOXACIN 750 MG/1
750 TABLET, FILM COATED ORAL EVERY 12 HOURS SCHEDULED
Qty: 21 TABLET | Refills: 0 | Status: SHIPPED | OUTPATIENT
Start: 2022-12-15 | End: 2022-12-15 | Stop reason: SDUPTHER

## 2022-12-15 RX ORDER — INSULIN GLARGINE 100 [IU]/ML
24 INJECTION, SOLUTION SUBCUTANEOUS
Qty: 5 ADJUSTABLE DOSE PRE-FILLED PEN SYRINGE | Refills: 3 | Status: SHIPPED | OUTPATIENT
Start: 2022-12-15

## 2022-12-15 RX ADMIN — LACTOBACILLUS TAB 4 TABLET: TAB at 08:45

## 2022-12-15 RX ADMIN — INSULIN LISPRO 2 UNITS: 100 INJECTION, SOLUTION INTRAVENOUS; SUBCUTANEOUS at 08:47

## 2022-12-15 RX ADMIN — ENOXAPARIN SODIUM 40 MG: 100 INJECTION SUBCUTANEOUS at 08:45

## 2022-12-15 RX ADMIN — INSULIN GLARGINE 24 UNITS: 100 INJECTION, SOLUTION SUBCUTANEOUS at 07:00

## 2022-12-15 RX ADMIN — CIPROFLOXACIN 750 MG: 500 TABLET, FILM COATED ORAL at 12:21

## 2022-12-15 RX ADMIN — SODIUM CHLORIDE, PRESERVATIVE FREE 10 ML: 5 INJECTION INTRAVENOUS at 08:51

## 2022-12-15 RX ADMIN — LACTOBACILLUS TAB 4 TABLET: TAB at 12:04

## 2022-12-15 RX ADMIN — INSULIN LISPRO 2 UNITS: 100 INJECTION, SOLUTION INTRAVENOUS; SUBCUTANEOUS at 12:00

## 2022-12-15 RX ADMIN — FERROUS SULFATE TAB 325 MG (65 MG ELEMENTAL FE) 325 MG: 325 (65 FE) TAB at 08:45

## 2022-12-15 NOTE — PROGRESS NOTES
Prescriptions given to patient for regular insulin and cipro and instructed how to take. Instructed to call primary care doctor to schedule follow up appointment in a week. Discharge instructions given to patient and to daughter. Discharged home with family.

## 2022-12-15 NOTE — PLAN OF CARE
Shift assessment complete. Patient is alert and oriented, in bed. Respirations are even and unlabored. Patient is on room air, does have ongoing non productive cough. Bowel sounds are present. IV antibiotics infusing per order. Evening blood sugar 300, covered with Humalog per order. Patient complained of headache, medicated with Norco.   Denies flank pain at this time. No needs expressed at this time.    Bed low and locked, call light within reach.   -----------------------------------------------------------------------------      Problem: Pain  Goal: Verbalizes/displays adequate comfort level or baseline comfort level  Outcome: Progressing     Problem: Safety - Adult  Goal: Free from fall injury  Outcome: Progressing     Problem: Chronic Conditions and Co-morbidities  Goal: Patient's chronic conditions and co-morbidity symptoms are monitored and maintained or improved  Outcome: Progressing

## 2022-12-15 NOTE — PROGRESS NOTES
Patient speaks Danish as their preferred language for their healthcare communication. If there are technical problems using the AMN mobil unit, please contact Language Services for interpretation at:    Senior Vaishali -Navigator ([phone])  General phone: [phone] ( [phone])  Email: [email or web address]    Please always document the use of interpreting services (name and/or 's ID number) in your clinical notes. Our interpreters are available for team members working with limited  English proficient (LEP) patients remotely, in person (if needed for special cases), as phone or video interpreters on the AM[employer] Mobil units.         Thank you,        Nina VERA  Senior /Navigator

## 2022-12-15 NOTE — DISCHARGE INSTRUCTIONS
tiempo. Orine antes de irse a dormir. Si usted presenta síntomas de infección en la vejiga, huy ardor al orinar u orinar con frecuencia, llame a hillman médico para que trate el problema antes de que empeore. Si no trata bree infección de vejiga de inmediato, puede extenderse al riñón. Los hombres deben mantener limpia la punta del pene. Si es [place], tenga en cuenta estas ideas:  Orine inmediatamente después de virginie tenido relaciones sexuales. Cámbiese las toallas sanitarias con frecuencia. Evite el uso de lavados vaginales, los aerosoles de higiene femenina y otros productos para la higiene femenina que contengan desodorantes. Después de ir al baño, límpiese de adelante hacia atrás. ¿Cuándo debe pedir ayuda? Llame a hillman médico ahora mismo o busque atención médica inmediata si:    Tiene dolor en aumento en la espalda varun debajo de las [place] se llama dolor en el flanco.     Tiene fiebre nueva o más denise y escalofríos. Tiene vómito o náuseas. Preste especial atención a los cambios en hillman vane y asegúrese de comunicarse con hillman médico si:    Los síntomas, huy el ardor al orinar, Niharika Worley a aparecer. No está mejorando después de 2 días. ¿Dónde puede encontrar más información? Estrellita Mcleod a [email or web address] e escriba F391 para más información sobre \"Infección renal: Instrucciones de cuidado. \"  Revisado: 16 junio, 2022               Versión del contenido: 13.5  © 2798-9204 [employer]wise, [employer]. Las instrucciones de cuidado fueron adaptadas bajo licencia por [facility] ([facility]). Si usted tiene [place] afección médica o sobre estas instrucciones, siempre pregunte a hillman profesional de vane. [facility] niega toda garantía o responsabilidad por hillman uso de esta información.

## 2022-12-15 NOTE — PROGRESS NOTES
Infectious Disease Progress Note  Today's Date: 12/13/2022   Admit Date: 12/11/2022     Impression:   12/11 blood cx with pan sensitive E. Coli.    12/11 urine cx with > 100 K colonies of  >3  non-pathogenic maria elena on cx     Plan:   Continue current ABX as is clinically improved  Can change to oral Cipro 750 mg bid to complete a total course of ABX of 14 days. EOT 12/25/22.    ID signing off     Anti-infectives:   CTX 12/11-     Subjective:   Afebrile, WBC normal CRT normal.

## 2022-12-15 NOTE — DISCHARGE SUMMARY
Hospitalist Discharge Summary   Admit Date:  2022  8:48 PM   DC Note date: 2022  Name:  Gabriella Martini   Age:  43 y.o. Sex:  female  :  1980   MRN:  [id number]   Room:  [facility]  PCP:  None Provider    Presenting Complaint: Cough, Dizziness, Emesis, and Generalized Body Aches     Initial Admission Diagnosis: Pyelonephritis [N12]  Acute pyelonephritis [N10]  Hyperglycemia [R73.9]  SHERINE (acute kidney injury) ([facility] [address] [N17.9]  Sepsis, due to unspecified organism, unspecified whether acute organ dysfunction present ([facility] [address] [A41.9]     Problem List for this Hospitalization (present on admission):    Principal Problem:    Pyelonephritis  Active Problems:    DM2 (diabetes mellitus, type 2) ([facility] [address]    Hyperlipidemia, mixed  Resolved Problems:    Hyponatremia    SHERINE (acute kidney injury) ([facility] [address]    Severe sepsis with acute organ dysfunction [facility])      Hospital Course:  40 yo female with PMH of DM 2, HLD, who presented with c/o generalized malaise and myalgias. C/o n/v, fevers, dizziness. BGL elevated. Found to have leukocytosis, hypernatremia (however normal for corrected glucose), . Creatinine 1.35 no baseline, UA +infection. CT abd showed concern for pyelonephritis. Rocephin started. BC + e coli. ID consulted. They recommend Ciprofloxacin. Repeat blood cultures remained negative. She felt much improved. She was discharged home to complete a course of Cipro x 2 weeks. AMN interprete, Sabi Aragon, was used during the discharge encounter. All questions answered. Disposition: Home  Diet: No diet orders on file  Code Status: Prior    Follow Ups:   Follow-up Information     None Provider Follow up in 1 week(s). Why: Call office to schedule                     Time spent in patient discharge and coordination 34 minutes. Follow up labs/diagnostics (ultimately defer to outpatient provider):  None    Plan was discussed with patient, nursing, and case management. All questions answered.   Patient was stable at time of discharge. Instructions given to call a physician or return if any concerns. Discharge Medication List as of 12/15/2022  2:05 PM        START taking these medications    Details   compa [facility]) Take 4 tablets by mouth 3 times daily, Disp-360 tablet, R-0Print      insulin regular (HUMULIN R) 100 UNIT/ML injection Inject 2 Units into the skin 3 times daily (before meals), Disp-10 mL, R-3Print      ciprofloxacin (CIPRO) 750 MG tablet Take 1 tablet by mouth every 12 hours for 21 doses, Disp-21 tablet, R-0Print           CONTINUE these medications which have CHANGED    Details   insulin glargine (LANTUS SOLOSTAR) 100 UNIT/ML injection pen Inject 24 Units into the skin every morning (before breakfast), Disp-5 Adjustable Dose Pre-filled Pen Syringe, R-3Print           CONTINUE these medications which have NOT CHANGED    Details   metFORMIN (GLUCOPHAGE) 500 MG tablet Take 1,000 mg by mouth 2 times daily (with meals)Historical Med      atorvastatin (LIPITOR) 20 MG tablet Take 40 mg by mouth nightlyHistorical Med      ferrous sulfate (IRON 325) 325 (65 Fe) MG tablet Take 325 mg by mouth dailyHistorical Med             Procedures done this admission:  * No surgery found *    Consults this admission:  IP CONSULT TO INFECTIOUS DISEASES  IP CONSULT TO DIABETES MANAGEMENT    Echocardiogram results:  No results found for this or any previous visit. Diagnostic Imaging/Tests:   XR CHEST PORTABLE    Result Date: 12/11/2022  Normal chest x-ray. CT ABDOMEN PELVIS RENAL STONE    Result Date: 12/11/2022  1. Hazy stranding around the left kidney. Differential considerations include pyelonephritis and sequela of a recently passed stone. 2. Constipation. Labs: Results:       BMP, Mg, Phos No results for input(s): NA, K, CL, CO2, ANIONGAP, BUN, CREATININE, LABGLOM, GFRAA, CALCIUM, GLUCOSE, MG, PHOS in the last 72 hours.      CBC No results for input(s): WBC, RBC, HGB, HCT, MCV, MCH, MCHC, RDW, PLT, MPV, NRBC, SEGS, LYMPHOPCT, EOSRELPCT, MONOPCT, BASOPCT, IMMGRAN, SEGSABS, LYMPHSABS, EOSABS, MONOSABS, BASOSABS, ABSIMMGRAN in the last 72 hours. LFT No results for input(s): BILITOT, BILIDIR, ALKPHOS, AST, ALT, PROT, LABALBU, GLOB in the last 72 hours. Cardiac  No results found for: NTPROBNP, TROPHS   Coags No results found for: PROTIME, INR, APTT   A1c Lab Results   Component Value Date/Time    LABA1C 10.5 12/13/2022 04:49 AM     12/13/2022 04:49 AM      Lipids No results found for: CHOL, LDLCALC, LABVLDL, HDL, CHOLHDLRATIO, TRIG   Thyroid  No results found for: [place]     Most Recent UA Lab Results   Component Value Date/Time    COLORU ORANGE 12/11/2022 08:58 PM    APPEARANCE CLOUDY 12/11/2022 08:58 PM    SPECGRAV 1.027 12/11/2022 08:58 PM    LABPH 5.0 12/11/2022 08:58 PM    PROTEINU 100 12/11/2022 08:58 PM    GLUCOSEU >1000 12/11/2022 08:58 PM    KETUA Negative 12/11/2022 08:58 PM    BILIRUBINUR SMALL 12/11/2022 08:58 PM    BLOODU TRACE 12/11/2022 08:58 PM    UROBILINOGEN 1.0 12/11/2022 08:58 PM    NITRU Positive 12/11/2022 08:58 PM    LEUKOCYTESUR MODERATE 12/11/2022 08:58 PM    WBCUA 20-50 12/11/2022 08:58 PM    RBCUA 0-3 12/11/2022 08:58 PM    EPITHUA 5-10 12/11/2022 08:58 PM    BACTERIA 4+ 12/11/2022 08:58 PM        Recent Labs     12/13/22  0638 12/13/22  0449 12/11/22 2125 12/11/22 2058   CULTURE NO GROWTH 5 DAYS NO GROWTH 5 DAYS ESCHERICHIA COLI*  Refer to Blood Culture ID Panel Accession [id number]  GRAM NEGATIVE RODS For identification and susceptibility refer to culture ACCESSION [id number]* >100,000 COLONIES/mL MIXED SKIN TAZ ISOLATED  THREE OR MORE TYPES OF ORGANISMS ARE PRESENT. THIS IS INDICATIVE OF CONTAMINATION DUE TO IMPROPER COLLECTION TECHNIQUE. PLEASE REPEAT COLLECTION UNLESS PATIENT HAS STARTED ANTIBIOTIC TREATMENT. All Labs from Last 24 Hrs:  No results found for this or any previous visit (from the past 24 hour(s)).       No Known Allergies  Immunization History   Administered Date(s) Administered    COVID-19, PFIZER PURPLE top, DILUTE for use, (age 15 y+), 30mcg/0.3mL 04/03/2021, 04/24/2021, 11/11/2021       Recent Vital Data:  No data found. Oxygen Therapy  SpO2: 99 %  O2 Device: None (Room air)    Estimated body mass index is 26.37 kg/m² as calculated from the following:    Height as of this encounter: 5' (1.524 m). Weight as of this encounter: 135 lb (61.2 kg). No intake or output data in the 24 hours ending 12/21/22 0854      Physical Exam:  General:    Well nourished. No overt distress  Head:  Normocephalic, atraumatic  Eyes:  Sclerae appear normal.  Pupils equally round. HENT:  Nares appear normal, no drainage. Moist mucous membranes  Neck:  No restricted ROM. Trachea midline  CV:   RRR. No m/r/g. No JVD  Lungs:   CTAB. No wheezing, rhonchi, or rales. Respirations even, unlabored  Abdomen:   Soft, nontender, nondistended. Extremities: Warm and dry. No cyanosis or clubbing. No edema. Skin:     No rashes. Normal coloration  Neuro:  CN II-XII grossly intact. Psych:  Normal mood and affect.     Signed:  Scott Murdock DO

## 2022-12-15 NOTE — DIABETES MGMT
Patient admitted with pyelonephritis. Blood glucose ranged 141-300 yesterday with patient receiving Lantus 24 units and Humalog 8 units. Blood glucose this morning was 130. Most recent FSBS 142. Creatinine 0.71. GFR >60. Reviewed patient current regimen: Lantus 24 units daily, Humalog 2 units with meals, and Humalog correctional insulin. Per chart review patient to discharge on Lantus 24 units and Regular 2 units with meals. Patient aware that she can purchase Novolin R at [facility] [employer] for ~$42 for 5 pens and states this is affordable. Patient has been encouraged to follow up with PCP for further titration of regimen.

## 2022-12-16 ENCOUNTER — TELEPHONE (OUTPATIENT)
Dept: INTERNAL MEDICINE CLINIC | Facility: CLINIC | Age: 42
End: 2022-12-16

## 2022-12-16 NOTE — TELEPHONE ENCOUNTER
Called patient to schedule TCV appt following discharge from [facility] 12/15/2022. Dx Pyelonephritis [N12]  Acute pyelonephritis [N10]  Hyperglycemia [R73.9]  SHERINE (acute kidney injury) ([facility] [address] [N17.9]  Sepsis, due to unspecified organism, unspecified whether acute organ dysfunction present ([facility] [address] [A41.9]        Per discharge follow up with PCP 1 week. 1st attempt to contact patient using phone number listed in chart through Language Services ID # [id number]    Patient states she has a PCP and will contact their office to schedule. Di not remember name but had information at home.

## 2022-12-18 LAB
BACTERIA SPEC CULT: NORMAL
BACTERIA SPEC CULT: NORMAL
SERVICE CMNT-IMP: NORMAL
SERVICE CMNT-IMP: NORMAL

## 2022-12-21 PROBLEM — A41.9 SEVERE SEPSIS WITH ACUTE ORGAN DYSFUNCTION (HCC): Status: ACTIVE | Noted: 2022-12-11

## 2022-12-21 PROBLEM — R65.20 SEVERE SEPSIS WITH ACUTE ORGAN DYSFUNCTION (HCC): Status: ACTIVE | Noted: 2022-12-11

## 2022-12-21 PROBLEM — A41.9 SEVERE SEPSIS WITH ACUTE ORGAN DYSFUNCTION (HCC): Status: RESOLVED | Noted: 2022-12-11 | Resolved: 2022-12-21

## 2022-12-21 PROBLEM — R65.20 SEVERE SEPSIS WITH ACUTE ORGAN DYSFUNCTION (HCC): Status: RESOLVED | Noted: 2022-12-11 | Resolved: 2022-12-21
